# Patient Record
Sex: FEMALE | Race: OTHER | HISPANIC OR LATINO | Employment: FULL TIME | URBAN - METROPOLITAN AREA
[De-identification: names, ages, dates, MRNs, and addresses within clinical notes are randomized per-mention and may not be internally consistent; named-entity substitution may affect disease eponyms.]

---

## 2021-01-19 ENCOUNTER — OFFICE VISIT (OUTPATIENT)
Dept: OBGYN CLINIC | Facility: CLINIC | Age: 26
End: 2021-01-19
Payer: COMMERCIAL

## 2021-01-19 VITALS — WEIGHT: 189.3 LBS | DIASTOLIC BLOOD PRESSURE: 80 MMHG | SYSTOLIC BLOOD PRESSURE: 140 MMHG

## 2021-01-19 DIAGNOSIS — Z01.419 ENCOUNTER FOR GYNECOLOGICAL EXAMINATION WITH PAPANICOLAOU SMEAR OF CERVIX: Primary | ICD-10-CM

## 2021-01-19 DIAGNOSIS — Z72.51 HIGH RISK SEXUAL BEHAVIOR, UNSPECIFIED TYPE: ICD-10-CM

## 2021-01-19 DIAGNOSIS — Z87.42 HISTORY OF OVARIAN CYST: ICD-10-CM

## 2021-01-19 PROCEDURE — 99385 PREV VISIT NEW AGE 18-39: CPT | Performed by: OBSTETRICS & GYNECOLOGY

## 2021-01-19 NOTE — PROGRESS NOTES
ASSESSMENT & PLAN: Candice Lehman is a 31 y o  Yandel Segundo with normal gynecologic exam     1   Routine well woman exam done today  2  Pap:  The patient's last pap was 2018  It was normal     Pap was done today  Current ASCCP Guidelines reviewed  3   STD testing  was done   4  Gardasil recommendations reviewed   5  The following were reviewed in today's visit: breast self exam, family planning choices, exercise and healthy diet  6  tx for chlamydia in September interested in repeat testing     CC: Annual Gynecologic Examination    HPI: Candice Lehman is a 31 y o  Yandel Segundo who presents for annual gynecologic examination  She has the following concerns:  Desires STD testing / states was diagnosed with ovarian cyst in past and desires repeat US   Also diagnosed with endometriosis via laparoscopy     Health Maintenance:    She wears her seatbelt routinely  She does not perform regular monthly self breast exams  She feels safe at home  History reviewed  No pertinent past medical history  History reviewed  No pertinent surgical history  OB/Gyn History:    Pt does not have menstrual issues  =    History of sexually transmitted infection: Yes  History of abnormal pap smears: No      Patient is currently sexually active  OB History        0    Para   0    Term   0       0    AB   0    Living   0       SAB   0    TAB   0    Ectopic   0    Multiple   0    Live Births   0                 No family history on file      Social History:  Social History     Socioeconomic History    Marital status: Single     Spouse name: Not on file    Number of children: Not on file    Years of education: Not on file    Highest education level: Not on file   Occupational History    Not on file   Social Needs    Financial resource strain: Not on file    Food insecurity     Worry: Not on file     Inability: Not on file    Transportation needs     Medical: Not on file     Non-medical: Not on file Tobacco Use    Smoking status: Not on file   Substance and Sexual Activity    Alcohol use: Not Currently    Drug use: Not Currently    Sexual activity: Yes     Partners: Male   Lifestyle    Physical activity     Days per week: Not on file     Minutes per session: Not on file    Stress: Not on file   Relationships    Social connections     Talks on phone: Not on file     Gets together: Not on file     Attends Tenriism service: Not on file     Active member of club or organization: Not on file     Attends meetings of clubs or organizations: Not on file     Relationship status: Not on file    Intimate partner violence     Fear of current or ex partner: Not on file     Emotionally abused: Not on file     Physically abused: Not on file     Forced sexual activity: Not on file   Other Topics Concern    Not on file   Social History Narrative    Not on file         No Known Allergies    No current outpatient medications on file  Review of Systems:  Constitutional :no fever, feels well, no tiredness, no recent weight gain or loss  ENT: no ear ache, no loss of hearing, no nosebleeds or nasal discharge, no sore throat or hoarseness  Cardiovascular: no complaints of slow or fast heart beat, no chest pain, no palpitations, no leg claudication or lower extremity edema  Respiratory: no complaints of shortness of shortness of breath, no PERAZA  Breasts:no complaints of breast pain, breast lump, or nipple discharge  Gastrointestinal: no complaints of abdominal pain, constipation, nausea, vomiting, or diarrhea or bloody stools  Genitourinary : no complaints of dysuria, incontinence, pelvic pain, no dysmenorrhea, vaginal discharge or abnormal vaginal bleeding and as noted in HPI  Musculoskeletal: no complaints of arthralgia, no myalgia, no joint swelling or stiffness, no limb pain or swelling    Integumentary: no complaints of skin rash or lesion, itching or dry skin  Neurological: no complaints of headache, no confusion, no numbness or tingling, no dizziness or fainting    Objective      /80   Wt 85 9 kg (189 lb 4 8 oz)   LMP 12/25/2020     General:   appears stated age, cooperative, alert normal mood and affect   Neck: normal, supple,trachea midline, no masses   Heart: regular rate and rhythm, S1, S2 normal, no murmur, click, rub or gallop   Lungs: clear to auscultation bilaterally   Breasts: normal appearance, no masses or tenderness   Abdomen: soft, non-tender, without masses or organomegaly   Vulva: normal   Vagina: normal vagina, no discharge, exudate, lesion, or erythema   Urethra: normal   Cervix: Normal, no discharge  Nontender     Uterus: normal size, contour, position, consistency, mobility, non-tender   Adnexa: no mass, fullness, tenderness   Psychiatric orientation to person, place, and time: normal  mood and affect: normal

## 2021-01-20 LAB
C TRACH RRNA SPEC QL NAA+PROBE: NOT DETECTED
N GONORRHOEA RRNA SPEC QL NAA+PROBE: NOT DETECTED

## 2021-01-21 LAB
CLINICAL INFO: NORMAL
CYTO CVX: NORMAL
CYTOLOGY CMNT CVX/VAG CYTO-IMP: NORMAL
DATE PREVIOUS BX: NORMAL
HPV E6+E7 MRNA CVX QL NAA+PROBE: NOT DETECTED
LMP START DATE: NORMAL
SL AMB PREV. PAP:: NORMAL
SPECIMEN SOURCE CVX/VAG CYTO: NORMAL

## 2021-01-28 ENCOUNTER — HOSPITAL ENCOUNTER (OUTPATIENT)
Dept: ULTRASOUND IMAGING | Facility: HOSPITAL | Age: 26
Discharge: HOME | End: 2021-01-28
Attending: OBSTETRICS & GYNECOLOGY
Payer: COMMERCIAL

## 2021-01-28 DIAGNOSIS — Z87.42 HISTORY OF OVARIAN CYST: ICD-10-CM

## 2021-01-28 PROCEDURE — 76830 TRANSVAGINAL US NON-OB: CPT

## 2021-01-28 PROCEDURE — 76856 US EXAM PELVIC COMPLETE: CPT

## 2021-01-28 NOTE — LETTER
[address] [place] [address]  [address] [facility] [address]      February 10, 2021    MRN: [id number]     Phone: [phone]     Dear Ms Madhavi Britt recently had a(n) Ultrasound performed on 1/28/2021 at  [address] [place] [address] that was requested by Amira Sanderson MD  The study was reviewed by a radiologist, which is a physician who specializes in medical imaging  The radiologist issued a report describing his or her findings  In that report there was a finding that the radiologist felt warranted further discussion with your health care provider and that discussion would be beneficial to you  The results were sent to Amira Sanderson MD on 1/31/2021  We recommend that you contact Amira Sanderson MD at [phone] or set up an appointment to discuss the results of the imaging test  If you have already heard from Amira Sanderson MD regarding the results of your study, you can disregard this letter  This letter is not meant to alarm you, but intended to encourage you to follow-up on your results with the provider that sent you for the imaging study  In addition, we have enclosed answers to frequently asked questions by other patients who have also received a letter to review results with their health care provider (see page two)  Thank you for choosing [address] [place] [address] for your medical imaging needs  FREQUENTLY ASKED QUESTIONS    1  Why am I receiving this letter? [facility] requires us to notify patients who have findings on imaging exams that may require more testing or follow-up with a health professional within the next 3 months          2  How serious is the finding on the imaging test?  This letter is sent to all patients who may need follow-up or more testing within the next 3 months  Receiving this letter does not necessarily mean you have a life-threatening imaging finding or disease  Recommendations in the radiologists imaging report are general in nature and it is up to your healthcare provider to say whether those recommendations make sense for your situation  You are strongly encouraged to talk to your health care provider about the results and ask whether additional steps need to be taken  3  Where can I get a copy of the final report for my recent radiology exam?  To get a full copy of the report you can access your records online at [email or web address] or please contact [address] [place] [address] Records Department at [phone] Monday through Friday between 8 am and 6 pm          4  What do I need to do now? Please contact your health care provider who requested the imaging study to discuss what further actions (if any) are needed  You may have already heard from (your ordering provider) in regard to this test in which case you can disregard this letter  NOTICE IN ACCORDANCE WITH THE [facility] PATIENT TEST RESULT INFORMATION ACT OF 2018    You are receiving this notice as a result of a determination by your diagnostic imaging service that further discussions of your test results are warranted and would be beneficial to you  The complete results of your test or tests have been or will be sent to the health care practitioner that ordered the test or tests  It is recommended that you contact your health care practitioner to discuss your results as soon as possible

## 2021-02-04 NOTE — RESULT ENCOUNTER NOTE
Please inform patient large bilateral ovarian cyst / I recommend follow up (can be virtual) to discuss thanks

## 2021-02-08 ENCOUNTER — APPOINTMENT (EMERGENCY)
Dept: CT IMAGING | Facility: HOSPITAL | Age: 26
End: 2021-02-08
Payer: COMMERCIAL

## 2021-02-08 ENCOUNTER — HOSPITAL ENCOUNTER (EMERGENCY)
Facility: HOSPITAL | Age: 26
Discharge: HOME | End: 2021-02-08
Attending: EMERGENCY MEDICINE | Admitting: EMERGENCY MEDICINE
Payer: COMMERCIAL

## 2021-02-08 ENCOUNTER — APPOINTMENT (EMERGENCY)
Dept: ULTRASOUND IMAGING | Facility: HOSPITAL | Age: 26
End: 2021-02-08
Payer: COMMERCIAL

## 2021-02-08 VITALS
SYSTOLIC BLOOD PRESSURE: 124 MMHG | WEIGHT: 186.07 LBS | HEART RATE: 80 BPM | RESPIRATION RATE: 18 BRPM | BODY MASS INDEX: 32.97 KG/M2 | OXYGEN SATURATION: 98 % | HEIGHT: 63 IN | TEMPERATURE: 99.1 F | DIASTOLIC BLOOD PRESSURE: 72 MMHG

## 2021-02-08 DIAGNOSIS — M54.50 ACUTE LEFT-SIDED LOW BACK PAIN WITHOUT SCIATICA: ICD-10-CM

## 2021-02-08 DIAGNOSIS — N83.8 OVARIAN MASS, LEFT: Primary | ICD-10-CM

## 2021-02-08 DIAGNOSIS — N83.8 OVARIAN MASS, RIGHT: ICD-10-CM

## 2021-02-08 LAB
ANION GAP SERPL CALCULATED.3IONS-SCNC: 5 MMOL/L (ref 4–13)
BASOPHILS # BLD AUTO: 0.03 THOUSANDS/ΜL (ref 0–0.1)
BASOPHILS NFR BLD AUTO: 0 % (ref 0–1)
BILIRUB UR QL STRIP: NEGATIVE
BUN SERPL-MCNC: 9 MG/DL (ref 5–25)
CALCIUM SERPL-MCNC: 9 MG/DL (ref 8.3–10.1)
CHLORIDE SERPL-SCNC: 104 MMOL/L (ref 100–108)
CLARITY UR: CLEAR
CO2 SERPL-SCNC: 29 MMOL/L (ref 21–32)
COLOR UR: YELLOW
COLOR, POC: YELLOW
CREAT SERPL-MCNC: 0.67 MG/DL (ref 0.6–1.3)
EOSINOPHIL # BLD AUTO: 0.12 THOUSAND/ΜL (ref 0–0.61)
EOSINOPHIL NFR BLD AUTO: 1 % (ref 0–6)
ERYTHROCYTE [DISTWIDTH] IN BLOOD BY AUTOMATED COUNT: 14.3 % (ref 11.6–15.1)
EXT PREG TEST URINE: NEGATIVE
EXT. CONTROL ED NAV: NORMAL
GFR SERPL CREATININE-BSD FRML MDRD: 122 ML/MIN/1.73SQ M
GLUCOSE SERPL-MCNC: 72 MG/DL (ref 65–140)
GLUCOSE UR STRIP-MCNC: NEGATIVE MG/DL
HCT VFR BLD AUTO: 42.6 % (ref 34.8–46.1)
HGB BLD-MCNC: 13.3 G/DL (ref 11.5–15.4)
HGB UR QL STRIP.AUTO: NEGATIVE
IMM GRANULOCYTES # BLD AUTO: 0.03 THOUSAND/UL (ref 0–0.2)
IMM GRANULOCYTES NFR BLD AUTO: 0 % (ref 0–2)
KETONES UR STRIP-MCNC: NEGATIVE MG/DL
LEUKOCYTE ESTERASE UR QL STRIP: NEGATIVE
LYMPHOCYTES # BLD AUTO: 2.09 THOUSANDS/ΜL (ref 0.6–4.47)
LYMPHOCYTES NFR BLD AUTO: 21 % (ref 14–44)
MCH RBC QN AUTO: 27.5 PG (ref 26.8–34.3)
MCHC RBC AUTO-ENTMCNC: 31.2 G/DL (ref 31.4–37.4)
MCV RBC AUTO: 88 FL (ref 82–98)
MONOCYTES # BLD AUTO: 0.69 THOUSAND/ΜL (ref 0.17–1.22)
MONOCYTES NFR BLD AUTO: 7 % (ref 4–12)
NEUTROPHILS # BLD AUTO: 7.01 THOUSANDS/ΜL (ref 1.85–7.62)
NEUTS SEG NFR BLD AUTO: 71 % (ref 43–75)
NITRITE UR QL STRIP: NEGATIVE
NRBC BLD AUTO-RTO: 0 /100 WBCS
PH UR STRIP.AUTO: 6 [PH] (ref 4.5–8)
PLATELET # BLD AUTO: 277 THOUSANDS/UL (ref 149–390)
PMV BLD AUTO: 11.4 FL (ref 8.9–12.7)
POTASSIUM SERPL-SCNC: 4.5 MMOL/L (ref 3.5–5.3)
PROT UR STRIP-MCNC: NEGATIVE MG/DL
RBC # BLD AUTO: 4.84 MILLION/UL (ref 3.81–5.12)
SODIUM SERPL-SCNC: 138 MMOL/L (ref 136–145)
SP GR UR STRIP.AUTO: 1.02 (ref 1–1.03)
UROBILINOGEN UR QL STRIP.AUTO: 1 E.U./DL
WBC # BLD AUTO: 9.97 THOUSAND/UL (ref 4.31–10.16)

## 2021-02-08 PROCEDURE — 76830 TRANSVAGINAL US NON-OB: CPT

## 2021-02-08 PROCEDURE — 81003 URINALYSIS AUTO W/O SCOPE: CPT

## 2021-02-08 PROCEDURE — 76856 US EXAM PELVIC COMPLETE: CPT

## 2021-02-08 PROCEDURE — G1004 CDSM NDSC: HCPCS

## 2021-02-08 PROCEDURE — 99284 EMERGENCY DEPT VISIT MOD MDM: CPT

## 2021-02-08 PROCEDURE — 74176 CT ABD & PELVIS W/O CONTRAST: CPT

## 2021-02-08 PROCEDURE — 81025 URINE PREGNANCY TEST: CPT | Performed by: EMERGENCY MEDICINE

## 2021-02-08 PROCEDURE — 80048 BASIC METABOLIC PNL TOTAL CA: CPT | Performed by: EMERGENCY MEDICINE

## 2021-02-08 PROCEDURE — 85025 COMPLETE CBC W/AUTO DIFF WBC: CPT | Performed by: EMERGENCY MEDICINE

## 2021-02-08 PROCEDURE — 99284 EMERGENCY DEPT VISIT MOD MDM: CPT | Performed by: EMERGENCY MEDICINE

## 2021-02-08 PROCEDURE — 36415 COLL VENOUS BLD VENIPUNCTURE: CPT | Performed by: EMERGENCY MEDICINE

## 2021-02-09 ENCOUNTER — TELEPHONE (OUTPATIENT)
Dept: OBGYN CLINIC | Facility: MEDICAL CENTER | Age: 26
End: 2021-02-09

## 2021-02-09 NOTE — ED PROVIDER NOTES
History  Chief Complaint   Patient presents with    Flank Pain     Patient reports L flank pain, urinary frequency since yesterday  This is an otherwise healthy 48-year-old female who presents with left-sided flank pain  Her pain began last night  Pain is described as a pressure, intermittent, radiating to her lower back, associated with nausea without vomiting  She tried taking Advil for the pain with mild relief  Denies any exacerbating factors  She does describe urinary frequency since yesterday  Denies any history of kidney stones  Patient is currently asymptomatic  Denies fever/chills, vomiting, lightheadedness/dizziness, numbness/weakness, headache, change in vision, URI symptoms, neck pain, chest pain, palpitations, shortness of breath, cough, back pain, abdominal pain, diarrhea, hematochezia, melena, dysuria, hematuria, abnormal vaginal discharge/bleeding  None       History reviewed  No pertinent past medical history  History reviewed  No pertinent surgical history  History reviewed  No pertinent family history  I have reviewed and agree with the history as documented  E-Cigarette/Vaping    E-Cigarette Use Never User      E-Cigarette/Vaping Substances     Social History     Tobacco Use    Smoking status: Current Some Day Smoker    Smokeless tobacco: Never Used    Tobacco comment: Hookah   Substance Use Topics    Alcohol use: Not Currently    Drug use: Not Currently       Review of Systems   Constitutional: Negative for chills and fever  HENT: Negative for rhinorrhea, sore throat and trouble swallowing  Eyes: Negative for photophobia and visual disturbance  Respiratory: Negative for cough, chest tightness and shortness of breath  Cardiovascular: Negative for chest pain, palpitations and leg swelling  Gastrointestinal: Positive for nausea  Negative for abdominal pain, blood in stool, diarrhea and vomiting  Endocrine: Negative for polyuria     Genitourinary: Positive for flank pain and frequency  Negative for dysuria, hematuria, vaginal bleeding and vaginal discharge  Musculoskeletal: Negative for back pain and neck pain  Skin: Negative for color change and rash  Allergic/Immunologic: Negative for immunocompromised state  Neurological: Negative for dizziness, weakness, light-headedness, numbness and headaches  All other systems reviewed and are negative  Physical Exam  Physical Exam  Constitutional:       General: She is not in acute distress  Appearance: Normal appearance  She is well-developed  HENT:      Mouth/Throat:      Pharynx: Uvula midline  Eyes:      Conjunctiva/sclera: Conjunctivae normal       Pupils: Pupils are equal, round, and reactive to light  Neck:      Thyroid: No thyroid mass or thyromegaly  Trachea: Trachea normal    Cardiovascular:      Rate and Rhythm: Normal rate and regular rhythm  Pulses: Normal pulses  Heart sounds: Normal heart sounds  No murmur  Pulmonary:      Effort: Pulmonary effort is normal       Breath sounds: Normal breath sounds  Abdominal:      General: Bowel sounds are normal       Palpations: Abdomen is soft  Tenderness: There is no abdominal tenderness  There is no guarding or rebound  Skin:     General: Skin is warm and dry  Neurological:      Mental Status: She is alert  Psychiatric:         Speech: Speech normal          Behavior: Behavior normal  Behavior is cooperative  Thought Content:  Thought content normal          Vital Signs  ED Triage Vitals [02/08/21 1819]   Temperature Pulse Respirations Blood Pressure SpO2   99 1 °F (37 3 °C) 82 16 139/85 98 %      Temp Source Heart Rate Source Patient Position - Orthostatic VS BP Location FiO2 (%)   Oral Monitor Sitting Left arm --      Pain Score       5           Vitals:    02/08/21 1819 02/08/21 2138   BP: 139/85 124/72   Pulse: 82 80   Patient Position - Orthostatic VS: Sitting Lying         Visual Acuity      ED Medications  Medications - No data to display    Diagnostic Studies  Results Reviewed     Procedure Component Value Units Date/Time    Basic metabolic panel [[id number]] Collected: 02/08/21 1924    Lab Status: Final result Specimen: Blood from Arm, Right Updated: 02/08/21 1958     Sodium 138 mmol/L      Potassium 4 5 mmol/L      Chloride 104 mmol/L      CO2 29 mmol/L      ANION GAP 5 mmol/L      BUN 9 mg/dL      Creatinine 0 67 mg/dL      Glucose 72 mg/dL      Calcium 9 0 mg/dL      eGFR 122 ml/min/1 73sq m     Narrative:      [place] guidelines for Chronic Kidney Disease (CKD):     Stage 1 with normal or high GFR (GFR > 90 mL/min/1 73 square meters)    Stage 2 Mild CKD (GFR = 60-89 mL/min/1 73 square meters)    Stage 3A Moderate CKD (GFR = 45-59 mL/min/1 73 square meters)    Stage 3B Moderate CKD (GFR = 30-44 mL/min/1 73 square meters)    Stage 4 Severe CKD (GFR = 15-29 mL/min/1 73 square meters)    Stage 5 End Stage CKD (GFR <15 mL/min/1 73 square meters)  Note: GFR calculation is accurate only with a steady state creatinine    CBC and differential [[id number]]  (Abnormal) Collected: 02/08/21 1924    Lab Status: Final result Specimen: Blood from Arm, Right Updated: 02/08/21 1941     WBC 9 97 Thousand/uL      RBC 4 84 Million/uL      Hemoglobin 13 3 g/dL      Hematocrit 42 6 %      MCV 88 fL      MCH 27 5 pg      MCHC 31 2 g/dL      RDW 14 3 %      MPV 11 4 fL      Platelets 421 Thousands/uL      nRBC 0 /100 WBCs      Neutrophils Relative 71 %      Immat GRANS % 0 %      Lymphocytes Relative 21 %      Monocytes Relative 7 %      Eosinophils Relative 1 %      Basophils Relative 0 %      Neutrophils Absolute 7 01 Thousands/µL      Immature Grans Absolute 0 03 Thousand/uL      Lymphocytes Absolute 2 09 Thousands/µL      Monocytes Absolute 0 69 Thousand/µL      Eosinophils Absolute 0 12 Thousand/µL      Basophils Absolute 0 03 Thousands/µL     POCT urinalysis dipstick [[id number]] (Normal) Resulted: 02/08/21 1913    Lab Status: Final result Specimen: Urine Updated: 02/08/21 1913     Color, UA yellow    POCT pregnancy, urine [[id number]]  (Normal) Resulted: 02/08/21 1913    Lab Status: Final result Updated: 02/08/21 1913     EXT PREG TEST UR (Ref: Negative) negative     Control valid    Urine Macroscopic, POC [[id number]] Collected: 02/08/21 1911    Lab Status: Final result Specimen: Urine Updated: 02/08/21 1912     Color, UA Yellow     Clarity, UA Clear     pH, UA 6 0     Leukocytes, UA Negative     Nitrite, UA Negative     Protein, UA Negative mg/dl      Glucose, UA Negative mg/dl      Ketones, UA Negative mg/dl      Urobilinogen, UA 1 0 E U /dl      Bilirubin, UA Negative     Blood, UA Negative     Specific Gravity, UA 1 025    Narrative:      CLINITEK RESULT                 US pelvis complete w transvaginal   Final Result by Quinton Lutz DO (02/08 2125)   Unremarkable uterus/endometrium  No evidence for ovarian torsion  Neither of the ovarian lesions demonstrate significant peripheral vascularity to suggest a tubo-ovarian abscess      Both ovaries are enlarged containing complex cystic lesions as described above  Right ovarian lesion: 7 6 cm  This may potentially be an atypical endometrioma  By O - rads ultrasound risk stratification and management system:   Score 5-high risk (greater than 50%) (management by gyn onc)   Unilocular cyst, any size, >4 papillary projections, color score any      Left ovarian lesion: 5 2 cm  This may potentially be an endometrioma  By O - rads ultrasound risk stratification and management system:   Score 3-low risk malignancy (1-10%) (management by gynecologist   Consider MRI)   Unilocular cyst, any size with irregular inner wall <3 mm height   Multilocular cysts <10 cm, smooth inner wall, color score 4      Left ovarian lesion: 3 2 cm  This is likely either a hemorrhagic cyst or endometrioma    Follow-up ultrasound recommended in 6-12 weeks  The study was marked in [facility] for immediate notification  Workstation performed: [id number]         CT renal stone study abdomen pelvis without contrast   Final Result by Blas Cintron MD (02/08 1951)         1  Two  pelvic masses measuring 8 and 6 2 cm  Limited evaluation absence of contrast   Recommend pelvic ultrasound  2   Bilateral nonobstructing 1 to 2 mm renal calculi  Workstation performed: [id number]                    Procedures  Procedures         ED Course                             SBIRT 22yo+      Most Recent Value   SBIRT (22 yo +)   In order to provide better care to our patients, we are screening all of our patients for alcohol and drug use  Would it be okay to ask you these screening questions? No Filed at: 02/08/2021 1835   Initial Alcohol Screen: US AUDIT-C    1  How often do you have a drink containing alcohol?  0 Filed at: 02/08/2021 1835   2  How many drinks containing alcohol do you have on a typical day you are drinking? 0 Filed at: 02/08/2021 1835   3a  Male UNDER 65: How often do you have five or more drinks on one occasion? 0 Filed at: 02/08/2021 1835   3b  FEMALE Any Age, or MALE 65+: How often do you have 4 or more drinks on one occassion? 0 Filed at: 02/08/2021 1835   Audit-C Score  0 Filed at: 02/08/2021 1835   CHELSI: How many times in the past year have you    Used an illegal drug or used a prescription medication for non-medical reasons? Never Filed at: 02/08/2021 [address]                    MDM  Number of Diagnoses or Management Options  Diagnosis management comments: Plan to check basic lab work, urine  CT renal stone study  Disposition pending results        Disposition  Final diagnoses:   Ovarian mass, left   Ovarian mass, right   Acute left-sided low back pain without sciatica     Time reflects when diagnosis was documented in both MDM as applicable and the Disposition within this note Time User Action Codes Description Comment    2/8/2021  9:29 PM Luna Barrette Add [N83 8] Bilateral tubo-ovarian mass     2/8/2021  9:29 PM Rae Davila TIMA Remove [N83 8] Bilateral tubo-ovarian mass     2/8/2021  9:30 PM Luna Barrette Add [N83 8] Ovarian mass, left     2/8/2021  9:30 PM Luna Barrette Add [N83 8] Ovarian mass, right     2/8/2021  9:30 PM Luna Barrette Add [M54 5] Acute left-sided low back pain without sciatica       ED Disposition     ED Disposition Condition Date/Time Comment    Discharge Stable Mon Feb 8, 2021  9:29 PM Flavio Faye discharge to home/self care  Follow-up Information     Follow up With Specialties Details Why Contact Info Additional Information    Vidal Corrales MD Gynecologic Oncology Schedule an appointment as soon as possible for a visit  Call tomorrow morning for follow up, need to follow up to make sure these are not cancerous [address] Emergency Department Emergency Medicine Go to  If symptoms worsen [facility] [phone]  [address] [facility] Emergency Department, [address] [facility] , [place], [place], [facility]          Patient's Medications    No medications on file     No discharge procedures on file      PDMP Review     None          ED Provider  Electronically Signed by           Damon Ochoa MD  02/08/21 8464

## 2021-02-09 NOTE — TELEPHONE ENCOUNTER
Pt called and requested to go over ultrasound and labwork results  Pt was told that it does take a few days for a provider to review results  Pt requested to speak with provider as soon as possible as she is worried about results  Pt also has a duplicate chart on file, request for merge was submitted  Please review

## 2021-03-01 ENCOUNTER — OFFICE VISIT (OUTPATIENT)
Dept: OBGYN CLINIC | Facility: CLINIC | Age: 26
End: 2021-03-01
Payer: COMMERCIAL

## 2021-03-01 VITALS — WEIGHT: 190.2 LBS | SYSTOLIC BLOOD PRESSURE: 130 MMHG | DIASTOLIC BLOOD PRESSURE: 75 MMHG

## 2021-03-01 DIAGNOSIS — N83.201 RIGHT OVARIAN CYST: Primary | ICD-10-CM

## 2021-03-01 DIAGNOSIS — Z87.42 HX OF ENDOMETRIOSIS: ICD-10-CM

## 2021-03-01 PROCEDURE — 99213 OFFICE O/P EST LOW 20 MIN: CPT | Performed by: OBSTETRICS & GYNECOLOGY

## 2021-03-01 NOTE — PROGRESS NOTES
Assessment Panchito Hameed was seen today for follow-up  Diagnoses and all orders for this visit:    Right ovarian cyst  -     MRI pelvis female wo and w contrast; Future    Hx of endometriosis         Plan  Today we reviewed pelvic US sent for pelvic pain and hx of ovarian cyst diagnosed since at least 2018  She had surgery at [facility] at that time (record reviewed) and endometriosis was diagnosed by laparoscopy  US shows bilateral complex ovarian cyst on right more than left  This seems most consistent with endometriomas and not hemorrhagic cyst as I am able to see [facility] records consistent with same size cyst in 2018  I have ordered pelvoc MRI to better characterize ovarian lesions  Today we discussed possible management options such as Lupron/ birth control or surgical management   Aware surgical management would be cystectomy vs oophorectomy  Patient interested in getting more definitive answers prior to commiting to next step in management        Darin Linares is a 31 y o  female here for a problem visit  Patient is complaining of pelvic pain on right more than left  States she had surgery for this in 2018 at [facility]  At that time was told cyst was drained   Since then she has experienced on and off pelvic pain  Interested in getting more information and discuss management options   There is no problem list on file for this patient  Gynecologic History  Patient's last menstrual period was 02/23/2021  The current method of family planning is none  History reviewed  No pertinent past medical history  No past surgical history on file  No family history on file    Social History     Socioeconomic History    Marital status: Single     Spouse name: Not on file    Number of children: Not on file    Years of education: Not on file    Highest education level: Not on file   Occupational History    Not on file   Social Needs    Financial resource strain: Not on file    Food insecurity     Worry: Not on file     Inability: Not on file    Transportation needs     Medical: Not on file     Non-medical: Not on file   Tobacco Use    Smoking status: Never Smoker    Smokeless tobacco: Never Used   Substance and Sexual Activity    Alcohol use: Not Currently    Drug use: Not Currently    Sexual activity: Yes     Partners: Male   Lifestyle    Physical activity     Days per week: Not on file     Minutes per session: Not on file    Stress: Not on file   Relationships    Social connections     Talks on phone: Not on file     Gets together: Not on file     Attends [employer] service: Not on file     Active member of club or organization: Not on file     Attends meetings of clubs or organizations: Not on file     Relationship status: Not on file    Intimate partner violence     Fear of current or ex partner: Not on file     Emotionally abused: Not on file     Physically abused: Not on file     Forced sexual activity: Not on file   Other Topics Concern    Not on file   Social History Narrative    Not on file     No Known Allergies  No current outpatient medications on file  Review of Systems  Constitutional :no fever, feels well, no tiredness, no recent weight gain or loss  ENT: no ear ache, no loss of hearing, no nosebleeds or nasal discharge, no sore throat or hoarseness  Cardiovascular: no complaints of slow or fast heart beat, no chest pain, no palpitations, no leg claudication or lower extremity edema  Respiratory: no complaints of shortness of shortness of breath, no PERAZA  Breasts:no complaints of breast pain, breast lump, or nipple discharge  Gastrointestinal: no complaints of abdominal pain, constipation, nausea, vomiting, or diarrhea or bloody stools  Genitourinary : no complaints of dysuria, incontinence no dysmenorrhea, vaginal discharge or abnormal vaginal bleeding and as noted in HPI  - positive for pelvic pain      Musculoskeletal: no complaints of arthralgia, no myalgia, no joint swelling or stiffness, no limb pain or swelling  Integumentary: no complaints of skin rash or lesion, itching or dry skin  Neurological: no complaints of headache, no confusion, no numbness or tingling, no dizziness or fainting     Objective     /75   Wt 86 3 kg (190 lb 3 2 oz)   LMP 02/23/2021     General:   appears stated age, cooperative, alert normal mood and affect   Lungs: Unlabored breathing    Skin normal skin turgor and no rashes  Psychiatric orientation to person, place, and time: normal  mood and affect: normal   UTERUS:  The uterus is anteverted in position, measuring 6 9 x 4 5 x 4 9 cm  Contour and echotexture appear normal   The cervix shows no suspicious abnormality      ENDOMETRIUM:    Normal caliber of 2 mm  Homogeneous and normal in appearance      OVARIES/ADNEXA:  Right ovary:  9 1 x 6 2 x 10 cm  There is a large ovarian cystic mass  Low-level pedicles and septations with Doppler flow measuring 8 6 x 5 2 x 9 7 cm  Based on the ACR O-RADS system, this is O-RADS category 2 (almost certain benign with <5% risk of malignancy ) The management   recommendation is evaluation with pelvic MRI with and without IV contrast      REFERENCE: Radiology 2020; [id number]              Doppler flow within normal limits      Left ovary:  6 4 x 5 4 x 6 2 cm     There is a complex cystic mass with low-level echoes measuring 5 2 x 4 6 x 5 27 Based on the ACR O-RADS system, this is O-RADS category 2 (almost certain benign with <6% risk of malignancy ) The management recommendation is evaluation with pelvic MRI   with and without IV contrast      REFERENCE: Radiology 2020; [id number]

## 2021-03-02 ENCOUNTER — TELEPHONE (OUTPATIENT)
Dept: OBGYN CLINIC | Facility: MEDICAL CENTER | Age: 26
End: 2021-03-02

## 2021-03-02 NOTE — TELEPHONE ENCOUNTER
----- Message from Rodrigo Ceballos RN sent at 3/1/2021  4:55 PM EST -----  Regarding: FW: MRI  I instructed patient to schedule MRI for Thursday or Friday at the soonest and call back with date and time for MRI  I asked her to contact you directly with that info  ----- Message -----  From: Chantal Green MD  Sent: 3/1/2021   1:08 PM EST  To: Rodrigo Ceballos RN, [facility]  Subject: MRI                                              Hello Ladies not sure who to send this to,  This patient was seen by me she has bilateral ovarian cyst - endometriomas most likley   I do want an MRI with and without contrast - does she need precert? If so can it be done ?    Thanks

## 2021-03-03 ENCOUNTER — TRANSCRIBE ORDERS (OUTPATIENT)
Dept: ADMINISTRATIVE | Facility: HOSPITAL | Age: 26
End: 2021-03-03

## 2021-03-03 DIAGNOSIS — N83.209 OVARIAN CYST: Primary | ICD-10-CM

## 2021-03-12 ENCOUNTER — TELEPHONE (OUTPATIENT)
Dept: OBGYN CLINIC | Facility: MEDICAL CENTER | Age: 26
End: 2021-03-12

## 2021-03-12 NOTE — TELEPHONE ENCOUNTER
PA completed, spoke to [place] from [employer]   Approved for pelvis mri with and without contrast   Cpt code: 78862  DX code: [id number]    Case number- [id number]  Auth [id number]

## 2021-03-12 NOTE — TELEPHONE ENCOUNTER
Attempted to call pt if mri had been scheduled and if scheduled outside of the network and to obtain insurance card  Pt does not have mychart and I was unable to leave voice message due to a mail box being full

## 2021-03-12 NOTE — TELEPHONE ENCOUNTER
----- Message from [facility] sent at 3/12/2021 12:59 PM EST -----  Regarding: FW: MRI    ----- Message -----  From: Stefani Christensen  Sent: 3/5/2021   9:48 AM EST  To: Stefani Christensen  Subject: RE: MRI                                          Pt is scheduled for 3/17  ----- Message -----  From: Elaine Canas RN  Sent: 3/1/2021   4:55 PM EST  To: Stefani Christensen  Subject: FW: MRI                                          I instructed patient to schedule MRI for Thursday or Friday at the soonest and call back with date and time for MRI  I asked her to contact you directly with that info  ----- Message -----  From: Blane Conway MD  Sent: 3/1/2021   1:08 PM EST  To: Elaine Canas RN, Stefani Christensen  Subject: MRI                                              Hello Ladies not sure who to send this to,  This patient was seen by me she has bilateral ovarian cyst - endometriomas most likley   I do want an MRI with and without contrast - does she need precert? If so can it be done ?    Thanks

## 2021-03-17 ENCOUNTER — HOSPITAL ENCOUNTER (OUTPATIENT)
Dept: RADIOLOGY | Age: 26
Discharge: HOME | End: 2021-03-17
Payer: COMMERCIAL

## 2021-03-17 DIAGNOSIS — N83.209 OVARIAN CYST: ICD-10-CM

## 2021-03-17 PROCEDURE — 72197 MRI PELVIS W/O & W/DYE: CPT

## 2021-03-17 PROCEDURE — G1004 CDSM NDSC: HCPCS

## 2021-03-17 PROCEDURE — A9585 GADOBUTROL INJECTION: HCPCS | Performed by: OBSTETRICS & GYNECOLOGY

## 2021-03-17 RX ADMIN — GADOBUTROL 8 ML: 604.72 INJECTION INTRAVENOUS at 09:55

## 2021-03-23 NOTE — RESULT ENCOUNTER NOTE
Please inform patient  Bilateral endometriomas ,please have patient scheduled follow up thanks, non urgent visit

## 2021-04-06 ENCOUNTER — OFFICE VISIT (OUTPATIENT)
Dept: OBGYN CLINIC | Facility: CLINIC | Age: 26
End: 2021-04-06
Payer: COMMERCIAL

## 2021-04-06 VITALS — SYSTOLIC BLOOD PRESSURE: 125 MMHG | DIASTOLIC BLOOD PRESSURE: 75 MMHG | BODY MASS INDEX: 33.52 KG/M2 | WEIGHT: 189.2 LBS

## 2021-04-06 DIAGNOSIS — N80.1 ENDOMETRIOMA OF OVARY: ICD-10-CM

## 2021-04-06 DIAGNOSIS — R10.2 PELVIC PAIN: Primary | ICD-10-CM

## 2021-04-06 PROCEDURE — 99213 OFFICE O/P EST LOW 20 MIN: CPT | Performed by: OBSTETRICS & GYNECOLOGY

## 2021-04-06 RX ORDER — NAPROXEN 250 MG/1
250 TABLET ORAL
Qty: 30 TABLET | Refills: 1 | Status: SHIPPED | OUTPATIENT
Start: 2021-04-06

## 2021-04-08 NOTE — PROGRESS NOTES
Assessment Danyel Garcia was seen today for follow-up  Diagnoses and all orders for this visit:    Pelvic pain  -     naproxen (NAPROSYN) 250 mg tablet; Take 1 tablet (250 mg total) by mouth 3 (three) times a day with meals    Endometrioma of ovary         Plan  30 yo G0 with pelvic pain and bilateral ovarian endometriomas   We discussed surgical management in the form of diagnostic laparoscopy and ovarian cystectomy with possible oophorectomy   We discussed plan would be to preserve ovarian function given her age and parity  Aware that if bleeding intraop sometimes an oophorectomy is indicated   Risks of surgery discussed such as injury to surrounding organ such as bowel , bladder, major blood vessels, ureters , uterus, tubes and ovaries   We discussed in event oophorectomy necessary there is a change of infertility     Patient verbalized understanding   We discussed timing of surgery but patient needed to plan with her job and desired to wait if possible     Subjective   Gaston Hoang is a 31 y o  female here for a problem visit  Patient is complaining of pelvic pain worse some days than others  States worse with ovulation  She had tried birth control but does not tolerate side effects  She has had to miss work some days because of pain  She had surgery for this at [facility] some years ago and was told had endometriomas which were drained  There is no problem list on file for this patient  Gynecologic History  No LMP recorded  The current method of family planning is none  History reviewed  No pertinent past medical history  History reviewed  No pertinent surgical history  History reviewed  No pertinent family history    Social History     Socioeconomic History    Marital status: Single     Spouse name: Not on file    Number of children: Not on file    Years of education: Not on file    Highest education level: Not on file   Occupational History    Not on file   Social Needs    Financial resource strain: Not on file    Food insecurity     Worry: Not on file     Inability: Not on file   [employer] needs     Medical: Not on file     Non-medical: Not on file   Tobacco Use    Smoking status: Never Smoker    Smokeless tobacco: Never Used    Tobacco comment: Hookah   Substance and Sexual Activity    Alcohol use: Not Currently    Drug use: Not Currently    Sexual activity: Yes     Partners: Male   Lifestyle    Physical activity     Days per week: Not on file     Minutes per session: Not on file    Stress: Not on file   Relationships    Social connections     Talks on phone: Not on file     Gets together: Not on file     Attends Yazidi service: Not on file     Active member of club or organization: Not on file     Attends meetings of clubs or organizations: Not on file     Relationship status: Not on file    Intimate partner violence     Fear of current or ex partner: Not on file     Emotionally abused: Not on file     Physically abused: Not on file     Forced sexual activity: Not on file   Other Topics Concern    Not on file   Social History Narrative    ** Merged History Encounter **          No Known Allergies    Current Outpatient Medications:     naproxen (NAPROSYN) 250 mg tablet, Take 1 tablet (250 mg total) by mouth 3 (three) times a day with meals, Disp: 30 tablet, Rfl: 1    Review of Systems  Constitutional :no fever, feels well, no tiredness, no recent weight gain or loss  ENT: no ear ache, no loss of hearing, no nosebleeds or nasal discharge, no sore throat or hoarseness  Cardiovascular: no complaints of slow or fast heart beat, no chest pain, no palpitations, no leg claudication or lower extremity edema    Respiratory: no complaints of shortness of shortness of breath, no PERAZA  Breasts:no complaints of breast pain, breast lump, or nipple discharge  Gastrointestinal: no complaints of abdominal pain, constipation, nausea, vomiting, or diarrhea or bloody stools  Genitourinary :  as noted in HPI  Musculoskeletal: no complaints of arthralgia, no myalgia, no joint swelling or stiffness, no limb pain or swelling  Integumentary: no complaints of skin rash or lesion, itching or dry skin  Neurological: no complaints of headache, no confusion, no numbness or tingling, no dizziness or fainting     Objective     /75   Wt 85 8 kg (189 lb 3 2 oz)   BMI 33 52 kg/m²     General:   appears stated age, cooperative, alert normal mood and affect   Skin normal skin turgor and no rashes  Psychiatric orientation to person, place, and time: normal  mood and affect: normal         FINDINGS:     UTERUS: The uterus is anteverted and measures 8 2 x 3 1 x 5 1 cm in size  There is borderline thickening of the junctional zone measuring 10 mm  The endometrium is within normal limits measuring 8 mm in thickness        ADNEXA:     The right ovary measures 9 8 x 8 5 x 5 5 cm in the left ovary measures 7 4 x 6 3 x 5 2 cm in size  There are bilateral ovarian lesions measuring 8 2 x 5 1 x 8 9 cm on the right and 5 8 x 4 7 x 5 3 cm on the left side (series 11 images 55-90),   corresponding to the lesion seen on prior CT and ultrasound examinations  Both lesions demonstrate hyperintense T1-weighted and mildly hypointense T2-weighted signal (T2 shading)  Both lesions demonstrate thin enhancing internal septations, but no   papillary projections or enhancing solid components  These are most in keeping with bilateral ovarian endometriomas      BLADDER: Normal      PELVIC CAVITY:  No lymphadenopathy      BOWEL:  Unremarkable MRI appearance      OSSEOUS STRUCTURES:   No osseous destruction      VASCULAR STRUCTURES:  Visualized vasculature is normal      PELVIC WALL:  Small right sided Bartholin's gland cyst measuring 9 x8mm (series 6 image 35)     IMPRESSION:        1    Bilateral ovarian lesions measuring 8 2 x 5 1 x 8 9 cm on the right and 5 8 x 4 7 x 5 3 cm on the left side, in keeping with bilateral ovarian endometriomas      2    Borderline thickening of the uterine junctional zone measuring 10 mm

## 2021-04-13 DIAGNOSIS — Z23 ENCOUNTER FOR IMMUNIZATION: ICD-10-CM

## 2021-05-11 ENCOUNTER — OFFICE VISIT (OUTPATIENT)
Dept: OBGYN CLINIC | Facility: MEDICAL CENTER | Age: 26
End: 2021-05-11
Payer: COMMERCIAL

## 2021-05-11 VITALS — BODY MASS INDEX: 33.3 KG/M2 | WEIGHT: 188 LBS | SYSTOLIC BLOOD PRESSURE: 130 MMHG | DIASTOLIC BLOOD PRESSURE: 80 MMHG

## 2021-05-11 DIAGNOSIS — Z72.51 HIGH RISK HETEROSEXUAL BEHAVIOR: ICD-10-CM

## 2021-05-11 DIAGNOSIS — N76.0 BV (BACTERIAL VAGINOSIS): Primary | ICD-10-CM

## 2021-05-11 DIAGNOSIS — B96.89 BV (BACTERIAL VAGINOSIS): Primary | ICD-10-CM

## 2021-05-11 DIAGNOSIS — B96.89 BV (BACTERIAL VAGINOSIS): ICD-10-CM

## 2021-05-11 DIAGNOSIS — N76.0 BV (BACTERIAL VAGINOSIS): ICD-10-CM

## 2021-05-11 PROCEDURE — 87491 CHLMYD TRACH DNA AMP PROBE: CPT | Performed by: OBSTETRICS & GYNECOLOGY

## 2021-05-11 PROCEDURE — 87591 N.GONORRHOEAE DNA AMP PROB: CPT | Performed by: OBSTETRICS & GYNECOLOGY

## 2021-05-11 PROCEDURE — 99213 OFFICE O/P EST LOW 20 MIN: CPT | Performed by: OBSTETRICS & GYNECOLOGY

## 2021-05-11 RX ORDER — METRONIDAZOLE 500 MG/1
500 TABLET ORAL EVERY 12 HOURS SCHEDULED
Qty: 14 TABLET | Refills: 0 | Status: SHIPPED | OUTPATIENT
Start: 2021-05-11 | End: 2021-05-11

## 2021-05-11 RX ORDER — METRONIDAZOLE 500 MG/1
500 TABLET ORAL EVERY 12 HOURS SCHEDULED
Qty: 14 TABLET | Refills: 0 | Status: SHIPPED | OUTPATIENT
Start: 2021-05-11 | End: 2021-05-18

## 2021-05-11 NOTE — PROGRESS NOTES
Assessment Wicho Glez was seen today for possible infection  Diagnoses and all orders for this visit:    BV (bacterial vaginosis)  -     Discontinue: metroNIDAZOLE (FLAGYL) 500 mg tablet; Take 1 tablet (500 mg total) by mouth every 12 (twelve) hours for 7 days    High risk heterosexual behavior  -     Chlamydia/GC amplified DNA by PCR         Plan;  KOH and wet mount done in office positive for clue cells , negative for hyphae and trichomonas   BV treatment discussed and medication sent to pharmacy  To keep next scheduled appointment for H&P for surgery     Darin Ortega is a 31 y o  female here for a problem visit  Patient is complaining of 1 week of increased vaginal discharge with strong odor   States she would be ovulating and this month has noted some bleeding as well with ovulation   There is no problem list on file for this patient  Gynecologic History  Patient's last menstrual period was 04/26/2021  The current method of family planning is none  No past medical history on file  No past surgical history on file  No family history on file    Social History     Socioeconomic History    Marital status: Single     Spouse name: Not on file    Number of children: Not on file    Years of education: Not on file    Highest education level: Not on file   Occupational History    Not on file   Social Needs    Financial resource strain: Not on file    Food insecurity     Worry: Not on file     Inability: Not on file    Transportation needs     Medical: Not on file     Non-medical: Not on file   Tobacco Use    Smoking status: Never Smoker    Smokeless tobacco: Never Used    Tobacco comment: Hookah   Substance and Sexual Activity    Alcohol use: Not Currently    Drug use: Not Currently    Sexual activity: Yes     Partners: Male   Lifestyle    Physical activity     Days per week: Not on file     Minutes per session: Not on file    Stress: Not on file Relationships    Social connections     Talks on phone: Not on file     Gets together: Not on file     Attends Confucianist service: Not on file     Active member of club or organization: Not on file     Attends meetings of clubs or organizations: Not on file     Relationship status: Not on file    Intimate partner violence     Fear of current or ex partner: Not on file     Emotionally abused: Not on file     Physically abused: Not on file     Forced sexual activity: Not on file   Other Topics Concern    Not on file   Social History Narrative    ** Merged History Encounter **          No Known Allergies    Current Outpatient Medications:     naproxen (NAPROSYN) 250 mg tablet, Take 1 tablet (250 mg total) by mouth 3 (three) times a day with meals, Disp: 30 tablet, Rfl: 1    metroNIDAZOLE (FLAGYL) 500 mg tablet, Take 1 tablet (500 mg total) by mouth every 12 (twelve) hours for 7 days, Disp: 14 tablet, Rfl: 0    Review of Systems  Constitutional :no fever, feels well, no tiredness, no recent weight gain or loss  ENT: no ear ache, no loss of hearing, no nosebleeds or nasal discharge, no sore throat or hoarseness  Cardiovascular: no complaints of slow or fast heart beat, no chest pain, no palpitations, no leg claudication or lower extremity edema  Respiratory: no complaints of shortness of shortness of breath, no PERAZA  Breasts:no complaints of breast pain, breast lump, or nipple discharge  Gastrointestinal: no complaints of abdominal pain, constipation, nausea, vomiting, or diarrhea or bloody stools  Genitourinary :  as noted in HPI  Musculoskeletal: no complaints of arthralgia, no myalgia, no joint swelling or stiffness, no limb pain or swelling    Integumentary: no complaints of skin rash or lesion, itching or dry skin  Neurological: no complaints of headache, no confusion, no numbness or tingling, no dizziness or fainting     Objective     /80   Wt 85 3 kg (188 lb)   LMP 04/26/2021   BMI 33 30 kg/m² General:   appears stated age, cooperative, alert normal mood and affect   Abdomen: soft, non-tender, without masses or organomegaly   Vulva: normal   Vagina: normal vagina, no discharge, exudate, lesion, or erythema   Urethra: normal   Cervix: scant pink discharge     Uterus: not examined   Adnexa: not evaluated   Lymphatic palpation of lymph nodes in neck, axilla, groin and/or other locations: no lymphadenopathy or masses noted   Skin normal skin turgor and no rashes     Psychiatric orientation to person, place, and time: normal  mood and affect: normal

## 2021-05-13 LAB
C TRACH DNA SPEC QL NAA+PROBE: NEGATIVE
N GONORRHOEA DNA SPEC QL NAA+PROBE: NEGATIVE

## 2021-05-14 ENCOUNTER — TELEPHONE (OUTPATIENT)
Dept: OBGYN CLINIC | Facility: MEDICAL CENTER | Age: 26
End: 2021-05-14

## 2021-05-14 NOTE — TELEPHONE ENCOUNTER
I contacted pt's insurance about upcoming sx  Effective 11/1/20 and active  Pt has a deductible of $2000 ($2000 met) and an oop of $4600 ($2347 30 met)  Pt will be covered at 80% until oop is met  No PA needed    Johanny Henriquez Ref# [id number]

## 2021-06-02 ENCOUNTER — OFFICE VISIT (OUTPATIENT)
Dept: OBGYN CLINIC | Facility: MEDICAL CENTER | Age: 26
End: 2021-06-02
Payer: COMMERCIAL

## 2021-06-02 VITALS — SYSTOLIC BLOOD PRESSURE: 135 MMHG | WEIGHT: 188 LBS | BODY MASS INDEX: 33.3 KG/M2 | DIASTOLIC BLOOD PRESSURE: 80 MMHG

## 2021-06-02 DIAGNOSIS — R10.2 PELVIC PAIN: Primary | ICD-10-CM

## 2021-06-02 DIAGNOSIS — N83.201 OVARIAN CYST, BILATERAL: ICD-10-CM

## 2021-06-02 DIAGNOSIS — N83.202 OVARIAN CYST, BILATERAL: ICD-10-CM

## 2021-06-02 DIAGNOSIS — N80.1 ENDOMETRIOMA OF OVARY: ICD-10-CM

## 2021-06-02 PROCEDURE — 99214 OFFICE O/P EST MOD 30 MIN: CPT | Performed by: OBSTETRICS & GYNECOLOGY

## 2021-06-17 RX ORDER — TOPIRAMATE 25 MG/1
TABLET ORAL
COMMUNITY
Start: 2021-06-14

## 2021-06-17 RX ORDER — IBUPROFEN 600 MG/1
TABLET ORAL EVERY 6 HOURS PRN
COMMUNITY

## 2021-06-17 NOTE — H&P (VIEW-ONLY)
Assessment James Mcgraw was seen today for physical exam     Diagnoses and all orders for this visit:    Pelvic pain    Endometrioma of ovary    Ovarian cyst, bilateral         Plan:  Prior to signing consent and as previously reviewed :   30 yo G0 with pelvic pain and bilateral ovarian endometriomas   We discussed surgical management in the form of diagnostic laparoscopy and ovarian cystectomy with possible oophorectomy   We discussed plan would be to preserve ovarian function given her age and parity  Aware that if bleeding intraop sometimes an oophorectomy is indicated   Risks of surgery discussed such as injury to surrounding organs ( such as bowel , bladder, major blood vessels, ureters , uterus, tubes and ovaries)   We discussed recovery time and pain management after surgery   We discussed in event oophorectomy necessary she would not be able to have children but could use donor eggs  Patient verbalized understanding   We also discussed possible need for laparotomy in event frozen pelvis in order to proceed safely   Her past hx is significant for  surgery at [facility] at that time (record reviewed) and endometriosis was diagnosed by laparoscopy at that time she had cystotomy performed   Alternatives to surgery we discussed Lupron vs birth control  States interested in surgical removal of endometriomas given pain       Subjective   Yomairajanet Adarsh Ortega is a 31 y o  female here for a H&P for surgery    Patient is complaining of pelvic pain on right more than left  States she had surgery for this in 2018 at [facility]  At that time was told cyst was drained   Since then she has experienced on and off pain for which at times she has had to miss work  There is no problem list on file for this patient  Gynecologic History  Patient's last menstrual period was 05/18/2021  The current method of family planning is none      Past Medical History:   Diagnosis Date    Bilateral ovarian cysts     Endometriosis      Past Surgical History:   Procedure Laterality Date    LAPAROSCOPIC OVARIAN CYSTECTOMY      WISDOM TOOTH EXTRACTION       History reviewed  No pertinent family history  Social History     Socioeconomic History    Marital status: Single     Spouse name: Not on file    Number of children: Not on file    Years of education: Not on file    Highest education level: Not on file   Occupational History    Not on file   Tobacco Use    Smoking status: Never Smoker    Smokeless tobacco: Never Used    Tobacco comment: Hookah   Vaping Use    Vaping Use: Never used   Substance and Sexual Activity    Alcohol use: Not Currently    Drug use: Not Currently    Sexual activity: Yes     Partners: Male   Other Topics Concern    Not on file   Social History Narrative    ** Merged History Encounter **          Social Determinants of Health     Financial Resource Strain:     Difficulty of Paying Living Expenses:    Food Insecurity:     Worried About Running Out of Food in the Last Year:     Ran Out of Food in the Last Year:    Transportation Needs:     Lack of Transportation (Medical):  Lack of Transportation (Non-Medical):    Physical Activity:     Days of Exercise per Week:     Minutes of Exercise per Session:    Stress:     Feeling of Stress :    Social Connections:     Frequency of Communication with Friends and Family:     Frequency of Social Gatherings with Friends and Family:     Attends [employer] Services:     Active Member of Clubs or Organizations:     Attends Club or Organization Meetings:     Marital Status:    Intimate Partner Violence:     Fear of Current or Ex-Partner:     Emotionally Abused:     Physically Abused:     Sexually Abused:       Allergies   Allergen Reactions    Latex Other (See Comments)     States "burns"       Current Outpatient Medications:     ibuprofen (MOTRIN) 600 mg tablet, Take by mouth every 6 (six) hours as needed for mild pain, Disp: , Rfl:    naproxen (NAPROSYN) 250 mg tablet, Take 1 tablet (250 mg total) by mouth 3 (three) times a day with meals (Patient not taking: Reported on 6/2/2021), Disp: 30 tablet, Rfl: 1    topiramate (TOPAMAX) 25 mg tablet, , Disp: , Rfl:     Review of Systems  Constitutional :no fever, feels well, no tiredness, no recent weight gain or loss  ENT: no ear ache, no loss of hearing, no nosebleeds or nasal discharge, no sore throat or hoarseness  Cardiovascular: no complaints of slow or fast heart beat, no chest pain, no palpitations, no leg claudication or lower extremity edema  Respiratory: no complaints of shortness of shortness of breath, no PERAZA  Breasts:no complaints of breast pain, breast lump, or nipple discharge  Gastrointestinal: no complaints of abdominal pain, constipation, nausea, vomiting, or diarrhea or bloody stools  Genitourinary : as noted in HPI  Musculoskeletal: no complaints of arthralgia, no myalgia, no joint swelling or stiffness, no limb pain or swelling  Integumentary: no complaints of skin rash or lesion, itching or dry skin  Neurological: no complaints of headache, no confusion, no numbness or tingling, no dizziness or fainting     Objective     /80   Wt 85 3 kg (188 lb)   LMP 05/18/2021   BMI 33 30 kg/m²     General:   appears stated age, cooperative, alert normal mood and affect   Neck: normal, supple,trachea midline, no masses   Heart: regular rate and rhythm, S1, S2 normal, no murmur, click, rub or gallop   Lungs: clear to auscultation bilaterally   Abdomen: soft, non-tender, without masses or organomegaly   Vulva: normal   Vagina: normal vagina, no discharge, exudate, lesion, or erythema   Urethra: normal   Cervix: Normal, no discharge     Uterus: normal size, contour, position, consistency, mobility, non-tender   Adnexa: fullness palpable on cul de sac mostly to patients right with limited mobility    Lymphatic palpation of lymph nodes in neck, axilla, groin and/or other locations: no lymphadenopathy or masses noted   Skin normal skin turgor and no rashes  Psychiatric orientation to person, place, and time: normal  mood and affect: normal   FINDINGS:     UTERUS: The uterus is anteverted and measures 8 2 x 3 1 x 5 1 cm in size   There is borderline thickening of the junctional zone measuring 10 mm   The endometrium is within normal limits measuring 8 mm in thickness        ADNEXA:     The right ovary measures 9 8 x 8 5 x 5 5 cm in the left ovary measures 7 4 x 6 3 x 5 2 cm in size   There are bilateral ovarian lesions measuring 8 2 x 5 1 x 8 9 cm on the right and 5 8 x 4 7 x 5 3 cm on the left side (series 11 images 55-90),   corresponding to the lesion seen on prior CT and ultrasound examinations   Both lesions demonstrate hyperintense T1-weighted and mildly hypointense T2-weighted signal (T2 shading)   Both lesions demonstrate thin enhancing internal septations, but no   papillary projections or enhancing solid components   These are most in keeping with bilateral ovarian endometriomas      BLADDER: Normal      PELVIC CAVITY:  No lymphadenopathy      BOWEL:  Unremarkable MRI appearance      OSSEOUS STRUCTURES:   No osseous destruction      VASCULAR STRUCTURES:  Visualized vasculature is normal      PELVIC WALL:  Small right sided Bartholin's gland cyst measuring 9 x8mm (series 6 image 35)     IMPRESSION:        1   Bilateral ovarian lesions measuring 8 2 x 5 1 x 8 9 cm on the right and 5 8 x 4 7 x 5 3 cm on the left side, in keeping with bilateral ovarian endometriomas      2   Borderline thickening of the uterine junctional zone measuring 10 mm

## 2021-06-17 NOTE — PRE-PROCEDURE INSTRUCTIONS
Pre-Surgery Instructions:   Medication Instructions    ibuprofen (MOTRIN) 600 mg tablet Instructed patient per Anesthesia Guidelines  All pre-op instructions reviewed as per [facility] w /pt verb understanding  Inst NPO post MN  Will be taking no meds on morning of sx  Instructed to avoid all ASA and OTC Vit/Supp 1 week prior to surgery and to avoid NSAIDs 3 days prior to surgery  Tylenol ok to take prn  Current hospital visitor & masking policy reviewed  Received pre-op showering instructions from surgeon office, has CHG, reviewed process at time of call  [facility] [place] to get her ordered preop testing done asap  Pt verbalized an understanding of all instructions reviewed and offers no concerns at this time

## 2021-06-17 NOTE — PROGRESS NOTES
Assessment Samantha Toure was seen today for physical exam     Diagnoses and all orders for this visit:    Pelvic pain    Endometrioma of ovary    Ovarian cyst, bilateral         Plan:  Prior to signing consent and as previously reviewed :   32 yo G0 with pelvic pain and bilateral ovarian endometriomas   We discussed surgical management in the form of diagnostic laparoscopy and ovarian cystectomy with possible oophorectomy   We discussed plan would be to preserve ovarian function given her age and parity  Aware that if bleeding intraop sometimes an oophorectomy is indicated   Risks of surgery discussed such as injury to surrounding organs ( such as bowel , bladder, major blood vessels, ureters , uterus, tubes and ovaries)   We discussed recovery time and pain management after surgery   We discussed in event oophorectomy necessary she would not be able to have children but could use donor eggs  Patient verbalized understanding   We also discussed possible need for laparotomy in event frozen pelvis in order to proceed safely   Her past hx is significant for  surgery at [facility] at that time (record reviewed) and endometriosis was diagnosed by laparoscopy at that time she had cystotomy performed   Alternatives to surgery we discussed Lupron vs birth control  States interested in surgical removal of endometriomas given pain       Subjective   Don Balbina Ortega is a 31 y o  female here for a H&P for surgery    Patient is complaining of pelvic pain on right more than left  States she had surgery for this in 2018 at [facility]  At that time was told cyst was drained   Since then she has experienced on and off pain for which at times she has had to miss work  There is no problem list on file for this patient  Gynecologic History  Patient's last menstrual period was 05/18/2021  The current method of family planning is none      Past Medical History:   Diagnosis Date    Bilateral ovarian cysts     Endometriosis      Past Surgical History:   Procedure Laterality Date    LAPAROSCOPIC OVARIAN CYSTECTOMY      WISDOM TOOTH EXTRACTION       History reviewed  No pertinent family history  Social History     Socioeconomic History    Marital status: Single     Spouse name: Not on file    Number of children: Not on file    Years of education: Not on file    Highest education level: Not on file   Occupational History    Not on file   Tobacco Use    Smoking status: Never Smoker    Smokeless tobacco: Never Used    Tobacco comment: Hookah   Vaping Use    Vaping Use: Never used   Substance and Sexual Activity    Alcohol use: Not Currently    Drug use: Not Currently    Sexual activity: Yes     Partners: Male   Other Topics Concern    Not on file   Social History Narrative    ** Merged History Encounter **          Social Determinants of Health     Financial Resource Strain:     Difficulty of Paying Living Expenses:    Food Insecurity:     Worried About Running Out of Food in the Last Year:     Ran Out of Food in the Last Year:    Transportation Needs:     Lack of Transportation (Medical):  Lack of Transportation (Non-Medical):    Physical Activity:     Days of Exercise per Week:     Minutes of Exercise per Session:    Stress:     Feeling of Stress :    Social Connections:     Frequency of Communication with Friends and Family:     Frequency of Social Gatherings with Friends and Family:     Attends [employer] Services:     Active Member of Clubs or Organizations:     Attends Club or Organization Meetings:     Marital Status:    Intimate Partner Violence:     Fear of Current or Ex-Partner:     Emotionally Abused:     Physically Abused:     Sexually Abused:       Allergies   Allergen Reactions    Latex Other (See Comments)     States "burns"       Current Outpatient Medications:     ibuprofen (MOTRIN) 600 mg tablet, Take by mouth every 6 (six) hours as needed for mild pain, Disp: , Rfl:    naproxen (NAPROSYN) 250 mg tablet, Take 1 tablet (250 mg total) by mouth 3 (three) times a day with meals (Patient not taking: Reported on 6/2/2021), Disp: 30 tablet, Rfl: 1    topiramate (TOPAMAX) 25 mg tablet, , Disp: , Rfl:     Review of Systems  Constitutional :no fever, feels well, no tiredness, no recent weight gain or loss  ENT: no ear ache, no loss of hearing, no nosebleeds or nasal discharge, no sore throat or hoarseness  Cardiovascular: no complaints of slow or fast heart beat, no chest pain, no palpitations, no leg claudication or lower extremity edema  Respiratory: no complaints of shortness of shortness of breath, no PERAZA  Breasts:no complaints of breast pain, breast lump, or nipple discharge  Gastrointestinal: no complaints of abdominal pain, constipation, nausea, vomiting, or diarrhea or bloody stools  Genitourinary : as noted in HPI  Musculoskeletal: no complaints of arthralgia, no myalgia, no joint swelling or stiffness, no limb pain or swelling  Integumentary: no complaints of skin rash or lesion, itching or dry skin  Neurological: no complaints of headache, no confusion, no numbness or tingling, no dizziness or fainting     Objective     /80   Wt 85 3 kg (188 lb)   LMP 05/18/2021   BMI 33 30 kg/m²     General:   appears stated age, cooperative, alert normal mood and affect   Neck: normal, supple,trachea midline, no masses   Heart: regular rate and rhythm, S1, S2 normal, no murmur, click, rub or gallop   Lungs: clear to auscultation bilaterally   Abdomen: soft, non-tender, without masses or organomegaly   Vulva: normal   Vagina: normal vagina, no discharge, exudate, lesion, or erythema   Urethra: normal   Cervix: Normal, no discharge     Uterus: normal size, contour, position, consistency, mobility, non-tender   Adnexa: fullness palpable on cul de sac mostly to patients right with limited mobility    Lymphatic palpation of lymph nodes in neck, axilla, groin and/or other locations: no lymphadenopathy or masses noted   Skin normal skin turgor and no rashes  Psychiatric orientation to person, place, and time: normal  mood and affect: normal   FINDINGS:     UTERUS: The uterus is anteverted and measures 8 2 x 3 1 x 5 1 cm in size   There is borderline thickening of the junctional zone measuring 10 mm   The endometrium is within normal limits measuring 8 mm in thickness        ADNEXA:     The right ovary measures 9 8 x 8 5 x 5 5 cm in the left ovary measures 7 4 x 6 3 x 5 2 cm in size   There are bilateral ovarian lesions measuring 8 2 x 5 1 x 8 9 cm on the right and 5 8 x 4 7 x 5 3 cm on the left side (series 11 images 55-90),   corresponding to the lesion seen on prior CT and ultrasound examinations   Both lesions demonstrate hyperintense T1-weighted and mildly hypointense T2-weighted signal (T2 shading)   Both lesions demonstrate thin enhancing internal septations, but no   papillary projections or enhancing solid components   These are most in keeping with bilateral ovarian endometriomas      BLADDER: Normal      PELVIC CAVITY:  No lymphadenopathy      BOWEL:  Unremarkable MRI appearance      OSSEOUS STRUCTURES:   No osseous destruction      VASCULAR STRUCTURES:  Visualized vasculature is normal      PELVIC WALL:  Small right sided Bartholin's gland cyst measuring 9 x8mm (series 6 image 35)     IMPRESSION:        1   Bilateral ovarian lesions measuring 8 2 x 5 1 x 8 9 cm on the right and 5 8 x 4 7 x 5 3 cm on the left side, in keeping with bilateral ovarian endometriomas      2   Borderline thickening of the uterine junctional zone measuring 10 mm

## 2021-06-20 ENCOUNTER — APPOINTMENT (OUTPATIENT)
Dept: LAB | Facility: HOSPITAL | Age: 26
End: 2021-06-20
Attending: OBSTETRICS & GYNECOLOGY
Payer: COMMERCIAL

## 2021-06-20 DIAGNOSIS — N80.9 ENDOMETRIOMA: ICD-10-CM

## 2021-06-20 DIAGNOSIS — Z01.818 PRE-OP TESTING: ICD-10-CM

## 2021-06-20 DIAGNOSIS — R10.2 PELVIC PAIN: ICD-10-CM

## 2021-06-20 LAB
ABO GROUP BLD: NORMAL
BASOPHILS # BLD AUTO: 0.02 THOUSANDS/ΜL (ref 0–0.1)
BASOPHILS NFR BLD AUTO: 0 % (ref 0–1)
BLD GP AB SCN SERPL QL: NEGATIVE
EOSINOPHIL # BLD AUTO: 0.13 THOUSAND/ΜL (ref 0–0.61)
EOSINOPHIL NFR BLD AUTO: 2 % (ref 0–6)
ERYTHROCYTE [DISTWIDTH] IN BLOOD BY AUTOMATED COUNT: 14.2 % (ref 11.6–15.1)
HCT VFR BLD AUTO: 37.6 % (ref 34.8–46.1)
HGB BLD-MCNC: 12.3 G/DL (ref 11.5–15.4)
IMM GRANULOCYTES # BLD AUTO: 0.02 THOUSAND/UL (ref 0–0.2)
IMM GRANULOCYTES NFR BLD AUTO: 0 % (ref 0–2)
LYMPHOCYTES # BLD AUTO: 1.56 THOUSANDS/ΜL (ref 0.6–4.47)
LYMPHOCYTES NFR BLD AUTO: 18 % (ref 14–44)
MCH RBC QN AUTO: 28.2 PG (ref 26.8–34.3)
MCHC RBC AUTO-ENTMCNC: 32.7 G/DL (ref 31.4–37.4)
MCV RBC AUTO: 86 FL (ref 82–98)
MONOCYTES # BLD AUTO: 0.52 THOUSAND/ΜL (ref 0.17–1.22)
MONOCYTES NFR BLD AUTO: 6 % (ref 4–12)
NEUTROPHILS # BLD AUTO: 6.26 THOUSANDS/ΜL (ref 1.85–7.62)
NEUTS SEG NFR BLD AUTO: 74 % (ref 43–75)
NRBC BLD AUTO-RTO: 0 /100 WBCS
PLATELET # BLD AUTO: 268 THOUSANDS/UL (ref 149–390)
PMV BLD AUTO: 11.5 FL (ref 8.9–12.7)
RBC # BLD AUTO: 4.36 MILLION/UL (ref 3.81–5.12)
RH BLD: POSITIVE
SPECIMEN EXPIRATION DATE: NORMAL
WBC # BLD AUTO: 8.51 THOUSAND/UL (ref 4.31–10.16)

## 2021-06-20 PROCEDURE — 86850 RBC ANTIBODY SCREEN: CPT

## 2021-06-20 PROCEDURE — 85025 COMPLETE CBC W/AUTO DIFF WBC: CPT

## 2021-06-20 PROCEDURE — 36415 COLL VENOUS BLD VENIPUNCTURE: CPT

## 2021-06-20 PROCEDURE — 86901 BLOOD TYPING SEROLOGIC RH(D): CPT

## 2021-06-20 PROCEDURE — 86900 BLOOD TYPING SEROLOGIC ABO: CPT

## 2021-06-22 ENCOUNTER — TELEPHONE (OUTPATIENT)
Dept: OBGYN CLINIC | Facility: MEDICAL CENTER | Age: 26
End: 2021-06-22

## 2021-06-22 ENCOUNTER — ANESTHESIA EVENT (OUTPATIENT)
Dept: PERIOP | Facility: HOSPITAL | Age: 26
End: 2021-06-22
Payer: COMMERCIAL

## 2021-06-23 ENCOUNTER — ANESTHESIA (OUTPATIENT)
Dept: PERIOP | Facility: HOSPITAL | Age: 26
End: 2021-06-23
Payer: COMMERCIAL

## 2021-06-23 ENCOUNTER — HOSPITAL ENCOUNTER (OUTPATIENT)
Facility: HOSPITAL | Age: 26
Setting detail: OUTPATIENT SURGERY
Discharge: HOME | End: 2021-06-23
Attending: OBSTETRICS & GYNECOLOGY | Admitting: OBSTETRICS & GYNECOLOGY
Payer: COMMERCIAL

## 2021-06-23 VITALS
DIASTOLIC BLOOD PRESSURE: 71 MMHG | OXYGEN SATURATION: 96 % | TEMPERATURE: 97.3 F | RESPIRATION RATE: 16 BRPM | HEIGHT: 63 IN | HEART RATE: 80 BPM | WEIGHT: 188 LBS | BODY MASS INDEX: 33.31 KG/M2 | SYSTOLIC BLOOD PRESSURE: 124 MMHG

## 2021-06-23 DIAGNOSIS — Z98.890 S/P LAPAROSCOPY: ICD-10-CM

## 2021-06-23 DIAGNOSIS — Z01.818 PRE-OP TESTING: Primary | ICD-10-CM

## 2021-06-23 PROBLEM — N80.9 ENDOMETRIOSIS: Status: ACTIVE | Noted: 2021-06-23

## 2021-06-23 PROBLEM — N80.129 ENDOMETRIOMA: Status: ACTIVE | Noted: 2021-06-23

## 2021-06-23 PROBLEM — N83.209 OVARIAN CYST: Status: ACTIVE | Noted: 2021-06-23

## 2021-06-23 PROBLEM — N80.9 ENDOMETRIOMA: Status: ACTIVE | Noted: 2021-06-23

## 2021-06-23 LAB
ABO GROUP BLD: NORMAL
BASOPHILS # BLD AUTO: 0.02 THOUSANDS/ΜL (ref 0–0.1)
BASOPHILS NFR BLD AUTO: 0 % (ref 0–1)
EOSINOPHIL # BLD AUTO: 0.1 THOUSAND/ΜL (ref 0–0.61)
EOSINOPHIL NFR BLD AUTO: 1 % (ref 0–6)
ERYTHROCYTE [DISTWIDTH] IN BLOOD BY AUTOMATED COUNT: 14.1 % (ref 11.6–15.1)
EXT PREGNANCY TEST URINE: NEGATIVE
EXT. CONTROL: NORMAL
HCT VFR BLD AUTO: 36.5 % (ref 34.8–46.1)
HGB BLD-MCNC: 11.8 G/DL (ref 11.5–15.4)
IMM GRANULOCYTES # BLD AUTO: 0.01 THOUSAND/UL (ref 0–0.2)
IMM GRANULOCYTES NFR BLD AUTO: 0 % (ref 0–2)
LYMPHOCYTES # BLD AUTO: 1.26 THOUSANDS/ΜL (ref 0.6–4.47)
LYMPHOCYTES NFR BLD AUTO: 17 % (ref 14–44)
MCH RBC QN AUTO: 27.9 PG (ref 26.8–34.3)
MCHC RBC AUTO-ENTMCNC: 32.3 G/DL (ref 31.4–37.4)
MCV RBC AUTO: 86 FL (ref 82–98)
MONOCYTES # BLD AUTO: 0.59 THOUSAND/ΜL (ref 0.17–1.22)
MONOCYTES NFR BLD AUTO: 8 % (ref 4–12)
NEUTROPHILS # BLD AUTO: 5.46 THOUSANDS/ΜL (ref 1.85–7.62)
NEUTS SEG NFR BLD AUTO: 74 % (ref 43–75)
NRBC BLD AUTO-RTO: 0 /100 WBCS
PLATELET # BLD AUTO: 275 THOUSANDS/UL (ref 149–390)
PMV BLD AUTO: 10.9 FL (ref 8.9–12.7)
RBC # BLD AUTO: 4.23 MILLION/UL (ref 3.81–5.12)
RH BLD: POSITIVE
WBC # BLD AUTO: 7.44 THOUSAND/UL (ref 4.31–10.16)

## 2021-06-23 PROCEDURE — 81025 URINE PREGNANCY TEST: CPT | Performed by: OBSTETRICS & GYNECOLOGY

## 2021-06-23 PROCEDURE — 49322 LAPAROSCOPY ASPIRATION: CPT | Performed by: OBSTETRICS & GYNECOLOGY

## 2021-06-23 PROCEDURE — 85025 COMPLETE CBC W/AUTO DIFF WBC: CPT | Performed by: OBSTETRICS & GYNECOLOGY

## 2021-06-23 PROCEDURE — 58350 REOPEN FALLOPIAN TUBE: CPT | Performed by: OBSTETRICS & GYNECOLOGY

## 2021-06-23 RX ORDER — NEOSTIGMINE METHYLSULFATE 1 MG/ML
INJECTION INTRAVENOUS AS NEEDED
Status: DISCONTINUED | OUTPATIENT
Start: 2021-06-23 | End: 2021-06-23

## 2021-06-23 RX ORDER — ACETAMINOPHEN 325 MG/1
975 TABLET ORAL EVERY 6 HOURS PRN
Status: DISCONTINUED | OUTPATIENT
Start: 2021-06-23 | End: 2021-06-23 | Stop reason: HOSPADM

## 2021-06-23 RX ORDER — KETOROLAC TROMETHAMINE 30 MG/ML
INJECTION, SOLUTION INTRAMUSCULAR; INTRAVENOUS AS NEEDED
Status: DISCONTINUED | OUTPATIENT
Start: 2021-06-23 | End: 2021-06-23

## 2021-06-23 RX ORDER — SODIUM CHLORIDE 9 MG/ML
INJECTION, SOLUTION INTRAVENOUS CONTINUOUS PRN
Status: DISCONTINUED | OUTPATIENT
Start: 2021-06-23 | End: 2021-06-23

## 2021-06-23 RX ORDER — FENTANYL CITRATE 50 UG/ML
INJECTION, SOLUTION INTRAMUSCULAR; INTRAVENOUS AS NEEDED
Status: DISCONTINUED | OUTPATIENT
Start: 2021-06-23 | End: 2021-06-23

## 2021-06-23 RX ORDER — BUPIVACAINE HYDROCHLORIDE 2.5 MG/ML
INJECTION, SOLUTION EPIDURAL; INFILTRATION; INTRACAUDAL AS NEEDED
Status: DISCONTINUED | OUTPATIENT
Start: 2021-06-23 | End: 2021-06-23 | Stop reason: HOSPADM

## 2021-06-23 RX ORDER — SODIUM CHLORIDE 9 MG/ML
125 INJECTION, SOLUTION INTRAVENOUS CONTINUOUS
Status: DISCONTINUED | OUTPATIENT
Start: 2021-06-23 | End: 2021-06-23 | Stop reason: HOSPADM

## 2021-06-23 RX ORDER — PROPOFOL 10 MG/ML
INJECTION, EMULSION INTRAVENOUS AS NEEDED
Status: DISCONTINUED | OUTPATIENT
Start: 2021-06-23 | End: 2021-06-23

## 2021-06-23 RX ORDER — ROCURONIUM BROMIDE 10 MG/ML
INJECTION, SOLUTION INTRAVENOUS AS NEEDED
Status: DISCONTINUED | OUTPATIENT
Start: 2021-06-23 | End: 2021-06-23

## 2021-06-23 RX ORDER — ALBUTEROL SULFATE 2.5 MG/3ML
2.5 SOLUTION RESPIRATORY (INHALATION) ONCE AS NEEDED
Status: DISCONTINUED | OUTPATIENT
Start: 2021-06-23 | End: 2021-06-23 | Stop reason: HOSPADM

## 2021-06-23 RX ORDER — CEFAZOLIN SODIUM 2 G/50ML
SOLUTION INTRAVENOUS AS NEEDED
Status: DISCONTINUED | OUTPATIENT
Start: 2021-06-23 | End: 2021-06-23

## 2021-06-23 RX ORDER — ONDANSETRON 2 MG/ML
INJECTION INTRAMUSCULAR; INTRAVENOUS AS NEEDED
Status: DISCONTINUED | OUTPATIENT
Start: 2021-06-23 | End: 2021-06-23

## 2021-06-23 RX ORDER — DEXAMETHASONE SODIUM PHOSPHATE 4 MG/ML
INJECTION, SOLUTION INTRA-ARTICULAR; INTRALESIONAL; INTRAMUSCULAR; INTRAVENOUS; SOFT TISSUE AS NEEDED
Status: DISCONTINUED | OUTPATIENT
Start: 2021-06-23 | End: 2021-06-23

## 2021-06-23 RX ORDER — SODIUM CHLORIDE, SODIUM LACTATE, POTASSIUM CHLORIDE, CALCIUM CHLORIDE 600; 310; 30; 20 MG/100ML; MG/100ML; MG/100ML; MG/100ML
INJECTION, SOLUTION INTRAVENOUS CONTINUOUS PRN
Status: DISCONTINUED | OUTPATIENT
Start: 2021-06-23 | End: 2021-06-23

## 2021-06-23 RX ORDER — HYDROMORPHONE HCL 110MG/55ML
PATIENT CONTROLLED ANALGESIA SYRINGE INTRAVENOUS AS NEEDED
Status: DISCONTINUED | OUTPATIENT
Start: 2021-06-23 | End: 2021-06-23

## 2021-06-23 RX ORDER — OXYCODONE HYDROCHLORIDE 5 MG/1
5 TABLET ORAL EVERY 4 HOURS PRN
Qty: 10 TABLET | Refills: 0 | Status: SHIPPED | OUTPATIENT
Start: 2021-06-23 | End: 2021-07-03

## 2021-06-23 RX ORDER — MAGNESIUM HYDROXIDE 1200 MG/15ML
LIQUID ORAL AS NEEDED
Status: DISCONTINUED | OUTPATIENT
Start: 2021-06-23 | End: 2021-06-23 | Stop reason: HOSPADM

## 2021-06-23 RX ORDER — FENTANYL CITRATE/PF 50 MCG/ML
25 SYRINGE (ML) INJECTION
Status: DISCONTINUED | OUTPATIENT
Start: 2021-06-23 | End: 2021-06-23 | Stop reason: HOSPADM

## 2021-06-23 RX ORDER — SODIUM CHLORIDE, SODIUM LACTATE, POTASSIUM CHLORIDE, CALCIUM CHLORIDE 600; 310; 30; 20 MG/100ML; MG/100ML; MG/100ML; MG/100ML
125 INJECTION, SOLUTION INTRAVENOUS CONTINUOUS
Status: DISCONTINUED | OUTPATIENT
Start: 2021-06-23 | End: 2021-06-23 | Stop reason: HOSPADM

## 2021-06-23 RX ORDER — GLYCOPYRROLATE 0.2 MG/ML
INJECTION INTRAMUSCULAR; INTRAVENOUS AS NEEDED
Status: DISCONTINUED | OUTPATIENT
Start: 2021-06-23 | End: 2021-06-23

## 2021-06-23 RX ORDER — MIDAZOLAM HYDROCHLORIDE 2 MG/2ML
INJECTION, SOLUTION INTRAMUSCULAR; INTRAVENOUS AS NEEDED
Status: DISCONTINUED | OUTPATIENT
Start: 2021-06-23 | End: 2021-06-23

## 2021-06-23 RX ORDER — OXYCODONE HYDROCHLORIDE 5 MG/1
5 TABLET ORAL EVERY 4 HOURS PRN
Status: DISCONTINUED | OUTPATIENT
Start: 2021-06-23 | End: 2021-06-23 | Stop reason: HOSPADM

## 2021-06-23 RX ADMIN — OXYCODONE HYDROCHLORIDE 5 MG: 5 TABLET ORAL at 16:45

## 2021-06-23 RX ADMIN — PROPOFOL 200 MG: 10 INJECTION, EMULSION INTRAVENOUS at 13:17

## 2021-06-23 RX ADMIN — SODIUM CHLORIDE, SODIUM LACTATE, POTASSIUM CHLORIDE, AND CALCIUM CHLORIDE: .6; .31; .03; .02 INJECTION, SOLUTION INTRAVENOUS at 14:59

## 2021-06-23 RX ADMIN — NEOSTIGMINE METHYLSULFATE 4 MG: 1 INJECTION INTRAVENOUS at 15:02

## 2021-06-23 RX ADMIN — SODIUM CHLORIDE 125 ML/HR: 0.9 INJECTION, SOLUTION INTRAVENOUS at 12:46

## 2021-06-23 RX ADMIN — ONDANSETRON 4 MG: 2 INJECTION INTRAMUSCULAR; INTRAVENOUS at 14:59

## 2021-06-23 RX ADMIN — CEFAZOLIN SODIUM 2000 MG: 2 SOLUTION INTRAVENOUS at 14:42

## 2021-06-23 RX ADMIN — HYDROMORPHONE HYDROCHLORIDE 0.5 MG: 2 INJECTION INTRAMUSCULAR; INTRAVENOUS; SUBCUTANEOUS at 14:37

## 2021-06-23 RX ADMIN — DEXAMETHASONE SODIUM PHOSPHATE 8 MG: 4 INJECTION INTRA-ARTICULAR; INTRALESIONAL; INTRAMUSCULAR; INTRAVENOUS; SOFT TISSUE at 13:32

## 2021-06-23 RX ADMIN — GLYCOPYRROLATE 0.6 MG: 0.2 INJECTION, SOLUTION INTRAMUSCULAR; INTRAVENOUS at 15:02

## 2021-06-23 RX ADMIN — FENTANYL CITRATE 50 MCG: 50 INJECTION INTRAMUSCULAR; INTRAVENOUS at 13:48

## 2021-06-23 RX ADMIN — MIDAZOLAM 2 MG: 1 INJECTION INTRAMUSCULAR; INTRAVENOUS at 13:11

## 2021-06-23 RX ADMIN — ROCURONIUM BROMIDE 50 MG: 10 INJECTION, SOLUTION INTRAVENOUS at 13:17

## 2021-06-23 RX ADMIN — ROCURONIUM BROMIDE 20 MG: 10 INJECTION, SOLUTION INTRAVENOUS at 14:11

## 2021-06-23 RX ADMIN — Medication 100 MG: at 13:17

## 2021-06-23 RX ADMIN — FENTANYL CITRATE 50 MCG: 50 INJECTION INTRAMUSCULAR; INTRAVENOUS at 15:12

## 2021-06-23 RX ADMIN — KETOROLAC TROMETHAMINE 30 MG: 30 INJECTION, SOLUTION INTRAMUSCULAR at 15:05

## 2021-06-23 RX ADMIN — FENTANYL CITRATE 100 MCG: 50 INJECTION INTRAMUSCULAR; INTRAVENOUS at 13:17

## 2021-06-23 RX ADMIN — SODIUM CHLORIDE: 0.9 INJECTION, SOLUTION INTRAVENOUS at 13:20

## 2021-06-23 NOTE — ANESTHESIA PREPROCEDURE EVALUATION
Procedure:  DX LAP (N/A Uterus)  CYSTECTOMY OVARIAN, LAP (Bilateral Uterus)  OOPHORECTOMY, LAP (Bilateral Uterus)    Relevant Problems   Other   (+) Endometriosis   (+) Ovarian cyst        Physical Exam    Airway    Mallampati score: II  TM Distance: >3 FB  Neck ROM: full     Dental   No notable dental hx     Cardiovascular  Rhythm: regular, Rate: normal, Cardiovascular exam normal    Pulmonary  Pulmonary exam normal Breath sounds clear to auscultation,     Other Findings        Anesthesia Plan  ASA Score- 2     Anesthesia Type- general with ASA Monitors  Additional Monitors:   Airway Plan: ETT  Plan Factors-Exercise tolerance (METS): >4 METS  Patient is not a current smoker  Patient did not smoke on day of surgery  Induction- intravenous  Postoperative Plan- Plan for postoperative opioid use  Planned trial extubation    Informed Consent- Anesthetic plan and risks discussed with patient

## 2021-06-23 NOTE — INTERVAL H&P NOTE
H&P reviewed  After examining the patient I find no changes in the patients condition since the H&P had been written      Vitals:    06/23/21 1206   BP: 110/67   Pulse: 81   Resp: 16   Temp: 99 °F (37 2 °C)   SpO2: 95%

## 2021-06-23 NOTE — ANESTHESIA POSTPROCEDURE EVALUATION
Post-Op Assessment Note    CV Status:  Stable    Pain management: adequate     Mental Status:  Alert and awake   Hydration Status:  Euvolemic   PONV Controlled:  Controlled   Airway Patency:  Patent  Airway: intubated      Post Op Vitals Reviewed: Yes      Staff: Anesthesiologist, CRNA         No complications documented      BP      Temp     Pulse     Resp      SpO2

## 2021-06-23 NOTE — DISCHARGE INSTRUCTIONS
Exploratory Laparoscopy   WHAT YOU NEED TO KNOW:   Exploratory laparoscopy is surgery to look for causes of pain, abnormal growths, bleeding, or disease in your abdomen  During this surgery, small incisions are made in your abdomen  A small scope and tools are inserted through these incisions  A scope is a flexible tube with a light and camera on the end  DISCHARGE INSTRUCTIONS:   Medicines:   · Antibiotics: This medicine is given to fight or prevent an infection caused by bacteria  · Pain medicine: You may be given a prescription medicine to decrease pain  Do not wait until the pain is severe before you take this medicine  · Take your medicine as directed  Contact your healthcare provider if you think your medicine is not helping or if you have side effects  Tell him or her if you are allergic to any medicine  Keep a list of the medicines, vitamins, and herbs you take  Include the amounts, and when and why you take them  Bring the list or the pill bottles to follow-up visits  Carry your medicine list with you in case of an emergency  Follow up with your healthcare provider or surgeon as directed: You may need to return to have your stitches removed  Write down your questions so you remember to ask them during your visits  Wound care:   · Follow your healthcare provider or surgeon's instructions: You may need to keep the bandages on your incisions for 1 to 2 days or until your follow-up visit  After your follow-up visit, you may need to change your bandage 1 to 2 times a day  · Wash your hands:  Use soap and warm water to wash your hands  Do this before and after you care for your wound  Hand washing helps prevent an infection  · Remove your bandages gently:  If the bandage sticks to your wound, use warm water on the bandage and lift it off slowly  Lift the edges toward the center of your wound  Carefully wash around the wound with soap and water   Try not to get soap and water directly on your wound  Dry the area and put on new, clean bandages as directed  Change your bandages when they get wet or dirty  Ask how to clean your wounds after your stitches are removed  Self-care:   · Pain:  You may have neck or shoulder pain from gas used during your surgery  Rest and use heat on your shoulder to help decrease the pain  You may be more comfortable if you elevate your head and shoulders on several pillows  · Rest:  You may feel like resting more after your surgery  Slowly start to do more each day  Rest when you feel it is needed  · Prevent constipation:  High-fiber foods, extra liquids, and regular exercise can help you prevent constipation  Examples of high-fiber foods are fruit and bran  Prune juice and water are good liquids to drink  Regular exercise helps your digestive system work  You may also be told to take over-the-counter fiber and stool softener medicines  Take these items as directed  · Vaginal bleeding: You may have vaginal bleeding for a day or two if your laparoscopy was done for a female problem  Ask when you can bathe:  Ask your healthcare provider when you can take a shower or bath  Contact your healthcare provider or surgeon if:   · You have a fever  · You have pain in your abdomen or shoulder that does not go away after 2 days or gets worse  · You have more vaginal bleeding or discharge than you expected  · You have trouble having a bowel movement, or have diarrhea often  · You have repeated vomiting  · You have chills, a cough, or feel weak and achy  · Your stitches are swollen, red, or have pus coming from them  · You have questions or concerns about your condition or care  Seek care immediately or call 911 if:   · Your incisions come apart  · Your incisions bleed, or blood soaks through your bandage  · Your arm or leg feels warm, tender, and painful  It may look swollen and red      · You suddenly feel lightheaded and short of breath  · You have chest pain when you take a deep breath or cough  You may cough up blood  © Copyright [employer] 2018 Information is for End User's use only and may not be sold, redistributed or otherwise used for commercial purposes  All illustrations and images included in CareNotes® are the copyrighted property of A D A M [employer] Inc  or Evelina Bentley  The above information is an  only  It is not intended as medical advice for individual conditions or treatments  Talk to your doctor, nurse or pharmacist before following any medical regimen to see if it is safe and effective for you  Endometriosis   WHAT YOU NEED TO KNOW:   Endometriosis is a condition in which tissue that is normally only in your uterus grows outside of the uterus  Endometriosis causes tissue that should be shed during a monthly period to grow on your ovaries, fallopian tubes, bladder, or other organs  Organs and tissue may stick together and cause inflammation and pain  DISCHARGE INSTRUCTIONS:   Seek care immediately if:   · You have severe pain that does not go away after you take pain medicine  Contact your healthcare provider if:   · Your symptoms return after treatment  · You have heavy or unusual vaginal bleeding  · You see blood in your urine or bowel movement  · You have questions or concerns about your condition or care  Medicines:   · Hormones  may help shrink endometrial tissue and decrease pain and inflammation  You may be given birth control pills, androgen hormones, or medicine that makes your body produce less of certain hormones  · Acetaminophen  decreases pain and is available without a doctor's order  Ask how much to take and how often to take it  Follow directions  Acetaminophen can cause liver damage if not taken correctly  · NSAIDs , such as ibuprofen, help decrease swelling, pain, and fever  This medicine is available with or without a doctor's order   NSAIDs can cause stomach bleeding or kidney problems in certain people  If you take blood thinner medicine, always ask your healthcare provider if NSAIDs are safe for you  Always read the medicine label and follow directions  · Take your medicine as directed  Contact your healthcare provider if you think your medicine is not helping or if you have side effects  Tell him or her if you are allergic to any medicine  Keep a list of the medicines, vitamins, and herbs you take  Include the amounts, and when and why you take them  Bring the list or the pill bottles to follow-up visits  Carry your medicine list with you in case of an emergency  Self-care:   · Apply heat  on your abdomen for 20 to 30 minutes every 2 hours for as many days as directed  Heat helps decrease pain and muscle spasms  · Exercise regularly  to help reduce symptoms, such as pain  Ask about the best exercise plan for you  For support and more information:   · [employer]  [address] [place] [address]  Phone: [phone]  Phone: 2- [phone]  Web Address: http://[employer]    Follow up with your healthcare provider as directed:  Write down your questions so you remember to ask them during your visits  © Copyright [employer] 2021 Information is for End User's use only and may not be sold, redistributed or otherwise used for commercial purposes  All illustrations and images included in CareNotes® are the copyrighted property of [employer]  or [facility] Candida Azevedo   The above information is an  only  It is not intended as medical advice for individual conditions or treatments  Talk to your doctor, nurse or pharmacist before following any medical regimen to see if it is safe and effective for you

## 2021-06-23 NOTE — OP NOTE
OPERATIVE REPORT  PATIENT NAME: Richelle Ortega    :  1995  MRN: [id number]  Pt Location: [facility] OR ROOM 02    SURGERY DATE: 2021    Surgeon(s) and Role:     Kathleen Lobo MD - Primary     * Willard Chambers MD - [address] [place] [address], DO - Fellow, Assisting    Preop Diagnosis:  Endometrioma [N80 9]  Pelvic pain [R10 2]    Post-Op Diagnosis Codes:     * Endometrioma [N80 9]     * Pelvic pain [R10 2]    Procedure(s) (LRB):  DX LAP, LYSIS OF ADHESIONS, RIGHT AND LEFT OVARIAN CYSTOTOMY (N/A)  CHROMOPERTUBATION (Bilateral)    Specimen(s):  * No specimens in log *    Estimated Blood Loss:   20 mL    Drains:  [REMOVED] NG/OG/Enteral Tube Orogastric Center mouth (Removed)   Number of days: 0       [REMOVED] Urethral Catheter Non-latex 16 Fr  (Removed)   Number of days: 0       Anesthesia Type:   General    Operative Indications:  Endometrioma [N80 9]  Pelvic pain [R10 2]    Operative Findings:  1  Bilateral Endometriomas  2  Obliterated posterior culdesac   3  Dense adhesions within the pelvic and lower abdominal cavity  4  Normal appearing uterus  5  Patent bilateral fallopian tubes- noted with chromopertubation    Complications:   None    Procedure and Technique:  Appropriate preoperative antibiotics chosen per ACOG guidelines were given  Bilateral SCDs were placed in the lower extremities for DVT prevention prior to the institution of anesthesia  No bladder, ureteral, viscus, or solid organ injury were noted at the end of the procedure  The patient was identified in the holding area by the operating room staff and attending physician  She was taken to the operating room where anesthesia was instituted without complications  She was placed in the dorsal lithotomy position with the legs in [address] [place] [address] with care taken to avoid excessive flexion or extension of her lower extremities  The patient was prepped and draped in the usual sterile fashion   A Alston catheter was inserted  Appropriate preoperative antibiotics chosen per ACOG guidelines were given  Bilateral SCDs were placed in the lower extremities for DVT prevention prior to the institution of anesthesia  No bladder, ureteral, viscus, or solid organ injury were noted at the end of the procedure  The patient was properly identified in the holding area by the operating room staff and attending physician  She was taken to the operating room where general anesthesia was administered without complications  She was placed in dorsal lithotomy with Vin stirrups with care taken to avoid excessive flexion or extension of her lower extremities  The patient was prepped and draped in normal sterile fashion  A time-out was performed  The patient was again properly identified by the operating room staff and the attending physician  Next, a KAYLIE manipulator was placed without complications  Next, a Alston catheter was placed  A 10 mm incision was made in the umbilicus  We inserted the 10 mm trocar under direct visualization with a 0-degree laparoscope  The abdomen was insufflated with CO2 gas until a pressure of 15 mmHg was reached  A survey of the abdominal cavity showed the above dictated findings  Dilute 0 25% Marcaine solution was injected in the left lower and right lower quadrants  Two additional 5 mm trocars were placed under direct visualization in the left lower quadrant and right lower quadrant under direct visualization, taking care to remain lateral to the inferior epigastric vessels  The patient was placed into steep trendelenburg positioning  A survey of the abdomen revealed dense adhesions and a large right ovarian endometrioma  Careful manipulation with blunt instrumentation was used to free the right ovarian endometrioma from the underlying tissue  A right ovarian cystotomy was was noted with gentle manipulation and dark cystic "chocolote colored" fluid was noted   The fluid was suctioned and the abdomen was copious irrigated  The right ovarian endometrioma was then resected away from the uterus and posterior culdesac  At this time the posterior cul-de-sac was noted to be frozen, with dense adhesions and obliteration of spaces secondary to bilateral endometriomas  A left cystectomy was attempted but an incidental cystotomy was performed  Again, dark "chocolate colored" cystic fluid was noted to extrude from the left ovarian endometrioma  The fluid was suctioned and abdomen/pelvis were copiously irrigated  Next chromopertubation was performed  Methylene blue was mixed 10u/100saline  Fluid was noted to extrude from bilateral fallopian tubes  The pelvis was inspected for continued bleeding and hemostasis was noted  After greater than 1 hour of sharp and blunt dissection, the decision was made to discontinue the efforts to free the ovarian endometriomas from the posterior and side walls secondary to the patients desire for future fertility and concerns for vascular compromise to the uterus or injury to the fallopian tubes  Copious irrigation was performed and we confirmed good hemostasis  Excess CO2 gas was released through the trocars prior to their removal  The skin incisions were closed with 4-0 Monocryl in a subcuticular fashion  Exofin was applied to the incisions  The Alston catheter was removed  The sponge, needle and instrument count were correct x 2  The patient tolerated the procedure well  She was awakened from anesthesia and transferred to the recovery room in stable condition  Dr Jessica Mayo was present for the entire procedure      Patient Disposition:  PACU     SIGNATURE: Becca Beckman DO  DATE: June 23, 2021  TIME: 3:37 PM

## 2021-06-30 ENCOUNTER — TELEPHONE (OUTPATIENT)
Dept: OBGYN CLINIC | Facility: MEDICAL CENTER | Age: 26
End: 2021-06-30

## 2021-06-30 NOTE — TELEPHONE ENCOUNTER
Had laparotomy and lysis of adhesions on 6/23/2021  C/O cramping that "comes and goes"  Has not taken any tylenol or motrin  Advised to try tylenol or motrin and notify office if no relief    Advised to go to ED for severe pain

## 2021-06-30 NOTE — TELEPHONE ENCOUNTER
Pt called stating she has cramping in her lower pelvic area shooting down her leg  Surgery was on 06/23/2021  Please review

## 2021-07-13 ENCOUNTER — OFFICE VISIT (OUTPATIENT)
Dept: OBGYN CLINIC | Facility: CLINIC | Age: 26
End: 2021-07-13

## 2021-07-13 VITALS — SYSTOLIC BLOOD PRESSURE: 125 MMHG | WEIGHT: 183 LBS | BODY MASS INDEX: 32.42 KG/M2 | DIASTOLIC BLOOD PRESSURE: 70 MMHG

## 2021-07-13 DIAGNOSIS — Z98.890 S/P LAPAROSCOPY: ICD-10-CM

## 2021-07-13 DIAGNOSIS — N80.9 ENDOMETRIOSIS: ICD-10-CM

## 2021-07-13 DIAGNOSIS — N80.1 ENDOMETRIOMA OF OVARY: Primary | ICD-10-CM

## 2021-07-13 PROCEDURE — 99024 POSTOP FOLLOW-UP VISIT: CPT | Performed by: OBSTETRICS & GYNECOLOGY

## 2021-07-13 RX ORDER — ETONOGESTREL AND ETHINYL ESTRADIOL 11.7; 2.7 MG/1; MG/1
INSERT, EXTENDED RELEASE VAGINAL
Qty: 3 EACH | Refills: 3 | Status: SHIPPED | OUTPATIENT
Start: 2021-07-13

## 2021-07-13 NOTE — PROGRESS NOTES
Subjective     Félix Ortega is a 31 y o  female who presents to the clinic 3 weeks status post diagnostic laparoscopy for adnexal mass and endometriomas  Eating a regular diet without difficulty  Bowel movements are normal  The patient is not having any pain  The following portions of the patient's history were reviewed and updated as appropriate: allergies, current medications, past family history, past medical history, past social history, past surgical history and problem list     Review of Systems  Pertinent items are noted in HPI  Objective     /70   Wt 83 kg (183 lb)   LMP 06/16/2021   BMI 32 42 kg/m²   General:  alert and oriented, in no acute distress   Abdomen: soft, bowel sounds active, non-tender   Incision:   healing well, no drainage, no erythema, no hernia, no seroma, no swelling, no dehiscence, incision well approximated         Assessment      Doing well postoperatively  Operative findings again reviewed  Pathology report discussed  We reviewed pictures from surgery and treatment options      Plan     1  Continue any current medications  2  Wound care discussed  3  Activity restrictions: none  4  Anticipated return to work: now  5  Follow up: 4 months for birth control follow up  Today we discussed pregnancy - aware patent tubes , however despite patency higher risk of ectopic given severity of endometriosis   We also discussed continuous birth control vs lupron  States interested in nuvaring as she has friends that do well on this / risks and benefits reviewed    Medication sent to pharmacy

## 2021-12-29 ENCOUNTER — OFFICE VISIT (OUTPATIENT)
Dept: URGENT CARE | Age: 26
End: 2021-12-29

## 2021-12-29 VITALS — OXYGEN SATURATION: 99 % | RESPIRATION RATE: 20 BRPM | HEART RATE: 111 BPM | TEMPERATURE: 98.2 F

## 2021-12-29 DIAGNOSIS — N39.0 URINARY TRACT INFECTION WITH HEMATURIA, SITE UNSPECIFIED: Primary | ICD-10-CM

## 2021-12-29 DIAGNOSIS — R31.9 URINARY TRACT INFECTION WITH HEMATURIA, SITE UNSPECIFIED: Primary | ICD-10-CM

## 2021-12-29 DIAGNOSIS — R35.0 URINARY FREQUENCY: ICD-10-CM

## 2021-12-29 LAB
SL AMB  POCT GLUCOSE, UA: NEGATIVE
SL AMB LEUKOCYTE ESTERASE,UA: ABNORMAL
SL AMB POCT BILIRUBIN,UA: NEGATIVE
SL AMB POCT BLOOD,UA: ABNORMAL
SL AMB POCT CLARITY,UA: ABNORMAL
SL AMB POCT COLOR,UA: YELLOW
SL AMB POCT KETONES,UA: NEGATIVE
SL AMB POCT NITRITE,UA: NEGATIVE
SL AMB POCT PH,UA: 5
SL AMB POCT SPECIFIC GRAVITY,UA: 1.01
SL AMB POCT URINE PROTEIN: ABNORMAL
SL AMB POCT UROBILINOGEN: 0.2

## 2021-12-29 PROCEDURE — 87086 URINE CULTURE/COLONY COUNT: CPT

## 2021-12-29 PROCEDURE — 87077 CULTURE AEROBIC IDENTIFY: CPT

## 2021-12-29 PROCEDURE — 99213 OFFICE O/P EST LOW 20 MIN: CPT

## 2021-12-29 PROCEDURE — 87186 SC STD MICRODIL/AGAR DIL: CPT

## 2021-12-29 PROCEDURE — 81002 URINALYSIS NONAUTO W/O SCOPE: CPT

## 2021-12-29 RX ORDER — NITROFURANTOIN 25; 75 MG/1; MG/1
100 CAPSULE ORAL 2 TIMES DAILY
Qty: 10 CAPSULE | Refills: 0 | Status: SHIPPED | OUTPATIENT
Start: 2021-12-29 | End: 2022-01-03

## 2021-12-31 LAB
BACTERIA UR CULT: ABNORMAL
BACTERIA UR CULT: ABNORMAL

## 2023-12-01 ENCOUNTER — TELEPHONE (OUTPATIENT)
Age: 28
End: 2023-12-01

## 2024-04-04 ENCOUNTER — NURSE TRIAGE (OUTPATIENT)
Age: 29
End: 2024-04-04

## 2024-04-04 NOTE — TELEPHONE ENCOUNTER
"Pt reports pelvic pain back in 2021. States she had surgery with Dr. Mccarthy for cysts. [place] why she has not been seen since. States she had the same pain in December of last year. States the pain came back again a month ago. States it comes and goes about twice a week, lasts a few minutes at a time. 2/10 pain. Denies abnormal discharge, odor, fever. LMP last week. Pt given appt for tomorrow and is thankful.   Reason for Disposition   Abdominal pain is a chronic symptom (recurrent or ongoing AND lasting > 4 weeks)    Answer Assessment - Initial Assessment Questions  1. LOCATION: \"Where does it hurt?\"       RLQ  2. RADIATION: \"Does the pain shoot anywhere else?\" (e.g., chest, back)      denies  3. ONSET: \"When did the pain begin?\" (e.g., minutes, hours or days ago)       One month ago  4. SUDDEN: \"Gradual or sudden onset?\"      sudden  5. PATTERN \"Does the pain come and go, or is it constant?\"     - If constant: \"Is it getting better, staying the same, or worsening?\"       (Note: Constant means the pain never goes away completely; most serious pain is constant and it progresses)      - If intermittent: \"How long does it last?\" \"Do you have pain now?\"      (Note: Intermittent means the pain goes away completely between bouts)      Comes and goes  6. SEVERITY: \"How bad is the pain?\"  (e.g., Scale 1-10; mild, moderate, or severe)    - MILD (1-3): doesn't interfere with normal activities, abdomen soft and not tender to touch     - MODERATE (4-7): interferes with normal activities or awakens from sleep, tender to touch     - SEVERE (8-10): excruciating pain, doubled over, unable to do any normal activities       2/10  7. RECURRENT SYMPTOM: \"Have you ever had this type of stomach pain before?\" If Yes, ask: \"When was the last time?\" and \"What happened that time?\"       Yes back in 2021 and saw Dr. Mccarthy and again last december 8. CAUSE: \"What do you think is causing the stomach " "pain?\"      cysts  9. RELIEVING/AGGRAVATING FACTORS: \"What makes it better or worse?\" (e.g., movement, antacids, bowel movement)      nothing  10. OTHER SYMPTOMS: \"Has there been any vomiting, diarrhea, constipation, or urine problems?\"        denies  11. PREGNANCY: \"Is there any chance you are pregnant?\" \"When was your last menstrual period?\"        Denies, LMP one week ago    Protocols used: Abdominal Pain - Female-ADULT-[place]    "

## 2024-04-05 ENCOUNTER — OFFICE VISIT (OUTPATIENT)
Age: 29
End: 2024-04-05
Payer: COMMERCIAL

## 2024-04-05 VITALS
DIASTOLIC BLOOD PRESSURE: 82 MMHG | SYSTOLIC BLOOD PRESSURE: 128 MMHG | HEIGHT: 63 IN | WEIGHT: 191 LBS | BODY MASS INDEX: 33.84 KG/M2

## 2024-04-05 DIAGNOSIS — Z87.42 HX OF ENDOMETRIOSIS: ICD-10-CM

## 2024-04-05 DIAGNOSIS — R10.2 PELVIC PAIN: Primary | ICD-10-CM

## 2024-04-05 PROCEDURE — 99202 OFFICE O/P NEW SF 15 MIN: CPT | Performed by: OBSTETRICS & GYNECOLOGY

## 2024-04-05 NOTE — PROGRESS NOTES
Assessment Keri was seen today for pelvic pain.    Diagnoses and all orders for this visit:    Pelvic pain  -     US pelvis complete w transvaginal; Future    Hx of endometriosis  -     US pelvis complete w transvaginal; Future         Plan  Known endometriosis currently no on treatment (stopped nuvaring ). Us ordered and will have follow up after as well as yearly visit   We discussed initiation of  birth control for pain control but desires to await US report       Subjective   Keri Ortega is a 28 y.o. female here for a problem visit.  Patient is complaining of right sided pelvic pain for  about one month. States since she last saw me she is now   a . Had an uncomplicated vaginal delivery at [facility] . Since delivery she has no been on birth control and just recently started experiencing right sided pelvic pain   Given her  hx of endometriomas decided to be evaluated . States discomfort is manageable but still desired evaluation      Patient Active Problem List   Diagnosis    Ovarian cyst    Endometriosis    Endometrioma    S/P laparoscopy       Gynecologic History  No LMP recorded.  The current method of family planning is none.    Past Medical History:   Diagnosis Date    Bilateral ovarian cysts     Endometriosis      Past Surgical History:   Procedure Laterality Date    LAPAROSCOPIC OVARIAN CYSTECTOMY      [place] CHROMOTUBATION OVIDUCT W/MATERIALS Bilateral 2021    Procedure: CHROMOPERTUBATION;  Surgeon: Vanessa Mccarthy MD;  Location: [facility];  Service: Gynecology    AZ LAPS ABD PRTM&OMENTUM DX W/WO SPEC BR/WA SPX N/A 2021    Procedure: DX LAP, LYSIS OF ADHESIONS, RIGHT AND LEFT OVARIAN CYSTOTOMY;  Surgeon: Vanessa Mccarthy MD;  Location: [facility];  Service: Gynecology    WISDOM TOOTH EXTRACTION       No family history on file.  Social History     Socioeconomic History    Marital status: Single     Spouse name: Not on file    Number of children:  Not on file    Years of education: Not on file    Highest education level: Not on file   Occupational History    Not on file   Tobacco Use    Smoking status: Never    Smokeless tobacco: Never    Tobacco comments:     Hookah   Vaping Use    Vaping status: Never Used   Substance and Sexual Activity    Alcohol use: Not Currently    Drug use: Not Currently    Sexual activity: Yes     Partners: Male   Other Topics Concern    Not on file   Social History Narrative    ** Merged History Encounter **          Social Determinants of Health     Financial Resource Strain: High Risk (2/12/2023)    Received from [facility]    Overall Financial Resource Strain (CARDIA)     Difficulty of Paying Living Expenses: Hard   Food Insecurity: Food Insecurity Present (11/19/2023)    Received from [facility]    Hunger Vital Sign     Worried About Running Out of Food in the Last Year: Often true     Ran Out of Food in the Last Year: Sometimes true   Transportation Needs: No Transportation Needs (2/12/2023)    Received from [facility]    PRAPARE - Transportation     Lack of Transportation (Medical): No     Lack of Transportation (Non-Medical): No   Physical Activity: Unknown (2/12/2023)    Received from [facility]    Exercise Vital Sign     Days of Exercise per Week: Patient declined     Minutes of Exercise per Session: Patient declined   Stress: No Stress Concern Present (2/12/2023)    Received from [facility]    [employer] - Occupational Stress Questionnaire     Feeling of Stress : Not at all   Social Connections: Unknown (2/12/2023)    Received from [facility]    Social Connection and Isolation Panel [NHANES]     Frequency of Communication with Friends and Family: More than three times a week     Frequency of Social Gatherings with Friends and Family: Never     Attends Scientologist Services: Never     Active Member of Clubs or Organizations: No  "    Attends Club or Organization Meetings: Never     Marital Status: Patient declined   Intimate Partner Violence: Not At Risk (2/12/2023)    Received from [facility]    Humiliation, Afraid, Rape, and Kick questionnaire     Fear of Current or Ex-Partner: No     Emotionally Abused: No     Physically Abused: No     Sexually Abused: No   Housing Stability: Low Risk  (2/12/2023)    Received from [facility]    Housing Stability Vital Sign     Unable to Pay for Housing in the Last Year: No     Number of Places Lived in the Last Year: 2     Unstable Housing in the Last Year: No     Allergies   Allergen Reactions    Latex Other (See Comments)     States \"burns\"       Current Outpatient Medications:     etonogestrel-ethinyl estradiol (NUVARING) 0.12-0.015 MG/24HR vaginal ring, Insert vaginally and leave in place for 3 consecutive weeks, then remove for 1 week. (Patient not taking: Reported on 12/29/2021), Disp: 3 each, Rfl: 3    ibuprofen (MOTRIN) 600 mg tablet, Take by mouth every 6 (six) hours as needed for mild pain (Patient not taking: Reported on 12/29/2021), Disp: , Rfl:     naproxen (NAPROSYN) 250 mg tablet, Take 1 tablet (250 mg total) by mouth 3 (three) times a day with meals (Patient not taking: Reported on 12/29/2021), Disp: 30 tablet, Rfl: 1    topiramate (TOPAMAX) 25 mg tablet, , Disp: , Rfl:     Review of Systems  Constitutional :no fever, feels well, no tiredness, no recent weight gain or loss  ENT: no ear ache, no loss of hearing, no nosebleeds or nasal discharge, no sore throat or hoarseness.  Cardiovascular: no complaints of slow or fast heart beat, no chest pain, no palpitations, no leg claudication or lower extremity edema.  Respiratory: no complaints of shortness of shortness of breath, no PERAZA  Breasts:no complaints of breast pain, breast lump, or nipple discharge  Gastrointestinal: no complaints of abdominal pain, constipation, nausea, vomiting, or diarrhea or bloody " "stools  Genitourinary : as noted in HPI.  Musculoskeletal: no complaints of arthralgia, no myalgia, no joint swelling or stiffness, no limb pain or swelling.  Integumentary: no complaints of skin rash or lesion, itching or dry skin  Neurological: no complaints of headache, no confusion, no numbness or tingling, no dizziness or fainting     Objective     /82   Ht 5' 3\" (1.6 m)   Wt 86.6 kg (191 lb)   BMI 33.83 kg/m²     General:   appears stated age, cooperative, alert normal mood and affect   Lungs: Unlabored breathing    Skin normal skin turgor and no rashes.   Psychiatric orientation to person, place, and time: normal. mood and affect: normal      "

## 2024-04-23 ENCOUNTER — HOSPITAL ENCOUNTER (OUTPATIENT)
Dept: ULTRASOUND IMAGING | Facility: MEDICAL CENTER | Age: 29
Discharge: HOME | End: 2024-04-23
Payer: COMMERCIAL

## 2024-04-23 DIAGNOSIS — Z87.42 HX OF ENDOMETRIOSIS: ICD-10-CM

## 2024-04-23 DIAGNOSIS — R10.2 PELVIC PAIN: ICD-10-CM

## 2024-04-23 PROCEDURE — 76830 TRANSVAGINAL US NON-OB: CPT

## 2024-04-23 PROCEDURE — 76856 US EXAM PELVIC COMPLETE: CPT

## 2024-05-03 NOTE — RESULT ENCOUNTER NOTE
Please inform patient her  result of  US shows resolution of her  left ovarian endometrioma   She only has a small  2.3 cm endometrioma on the right   This is excellent news

## 2024-05-17 ENCOUNTER — HOSPITAL ENCOUNTER (EMERGENCY)
Facility: HOSPITAL | Age: 29
Discharge: HOME | End: 2024-05-17
Attending: EMERGENCY MEDICINE
Payer: COMMERCIAL

## 2024-05-17 VITALS
DIASTOLIC BLOOD PRESSURE: 65 MMHG | OXYGEN SATURATION: 98 % | BODY MASS INDEX: 34.44 KG/M2 | TEMPERATURE: 98.3 F | HEART RATE: 60 BPM | RESPIRATION RATE: 18 BRPM | WEIGHT: 194.45 LBS | SYSTOLIC BLOOD PRESSURE: 139 MMHG

## 2024-05-17 DIAGNOSIS — S02.5XXA FRACTURED TOOTH: ICD-10-CM

## 2024-05-17 DIAGNOSIS — K08.89 PAIN, DENTAL: Primary | ICD-10-CM

## 2024-05-17 DIAGNOSIS — K08.89 DENTALGIA: ICD-10-CM

## 2024-05-17 PROCEDURE — 99283 EMERGENCY DEPT VISIT LOW MDM: CPT

## 2024-05-17 PROCEDURE — 99284 EMERGENCY DEPT VISIT MOD MDM: CPT

## 2024-05-17 RX ORDER — AMOXICILLIN AND CLAVULANATE POTASSIUM 875; 125 MG/1; MG/1
1 TABLET, FILM COATED ORAL ONCE
Status: COMPLETED | OUTPATIENT
Start: 2024-05-17 | End: 2024-05-17

## 2024-05-17 RX ORDER — ACETAMINOPHEN 325 MG/1
975 TABLET ORAL ONCE
Status: COMPLETED | OUTPATIENT
Start: 2024-05-17 | End: 2024-05-17

## 2024-05-17 RX ORDER — KETOROLAC TROMETHAMINE 30 MG/ML
15 INJECTION, SOLUTION INTRAMUSCULAR; INTRAVENOUS ONCE
Status: DISCONTINUED | OUTPATIENT
Start: 2024-05-17 | End: 2024-05-17

## 2024-05-17 RX ORDER — LIDOCAINE HYDROCHLORIDE 20 MG/ML
15 SOLUTION OROPHARYNGEAL ONCE
Status: COMPLETED | OUTPATIENT
Start: 2024-05-17 | End: 2024-05-17

## 2024-05-17 RX ADMIN — LIDOCAINE HYDROCHLORIDE 15 ML: 20 SOLUTION ORAL at 23:10

## 2024-05-17 RX ADMIN — ACETAMINOPHEN 975 MG: 325 TABLET, FILM COATED ORAL at 23:08

## 2024-05-17 RX ADMIN — AMOXICILLIN AND CLAVULANATE POTASSIUM 1 TABLET: 875; 125 TABLET, FILM COATED ORAL at 23:09

## 2024-05-18 RX ORDER — AMOXICILLIN AND CLAVULANATE POTASSIUM 875; 125 MG/1; MG/1
1 TABLET, FILM COATED ORAL EVERY 12 HOURS
Qty: 14 TABLET | Refills: 0 | Status: SHIPPED | OUTPATIENT
Start: 2024-05-18 | End: 2024-05-25

## 2024-05-18 RX ORDER — NAPROXEN 500 MG/1
500 TABLET ORAL 2 TIMES DAILY WITH MEALS
Qty: 20 TABLET | Refills: 0 | Status: SHIPPED | OUTPATIENT
Start: 2024-05-18

## 2024-05-18 NOTE — ED PROVIDER NOTES
"History  Chief Complaint   Patient presents with    Dental Pain     Pt reports \"dental pain for 3 days, pain on lower right side, pt reports unable to close mouth correctly. Motrin 2hr pta\"     Patient is a 28 female presented ED with chief complaint of dental pain.  States that she has had dental pain in the past.  Patient has significant past medical history of bilateral ovarian cyst, endometriosis.  Patient stated that she had this tooth filled.  She stated that she feels as though the filling did weaken the edges of the tooth.  Patient stated that after the filling fell out of the back of the tooth fracture.  She located the affected tooth to the bottom right jaw.  Tooth #29.  Patient stated that she has been over-the-counter ibuprofen Tylenol with minimal relief.  Stated that she wanted to buy over-the-counter anesthetic for the tooth but still had minimal relief.  Patient denies any abscess or drainage in the back throat.  She does endorse pain with eating.  She states that the lower part of her jaw is also sore tooth.  Patient denies any chills, fevers, diaphoresis.  Denies any vomiting or diarrhea.  She denies any facial swelling or headaches..Patient denies any chest pain, shortness of breath, vomiting, diarrhea, chills, diaphoresis, fevers, loss of consciousness, syncope, urinary and bowel changes, abdominal pain, visual symptoms, vision loss, loss of function, loss of sensation, decreased oral intake, hemoptysis, hematochezia, hematemesis, melena, confusion.         Prior to Admission Medications   Prescriptions Last Dose Informant Patient Reported? Taking?   etonogestrel-ethinyl estradiol (NUVARING) 0.12-0.015 MG/24HR vaginal ring   No No   Sig: Insert vaginally and leave in place for 3 consecutive weeks, then remove for 1 week.   Patient not taking: Reported on 12/29/2021   ibuprofen (MOTRIN) 600 mg tablet   Yes No   Sig: Take by mouth every 6 (six) hours as needed for mild pain   Patient not taking: " Reported on 12/29/2021   naproxen (NAPROSYN) 250 mg tablet   No No   Sig: Take 1 tablet (250 mg total) by mouth 3 (three) times a day with meals   Patient not taking: Reported on 12/29/2021   topiramate (TOPAMAX) 25 mg tablet   Yes No   Patient not taking: Reported on 7/13/2021      Facility-Administered Medications: None       Past Medical History:   Diagnosis Date    Bilateral ovarian cysts     Endometriosis        Past Surgical History:   Procedure Laterality Date    LAPAROSCOPIC OVARIAN CYSTECTOMY      [place] CHROMOTUBATION OVIDUCT W/MATERIALS Bilateral 6/23/2021    Procedure: CHROMOPERTUBATION;  Surgeon: Vanessa Mccarthy MD;  [facility];  Service: Gynecology    [place] LAPS ABD PRTM&OMENTUM DX W/WO SPEC BR/WA SPX N/A 6/23/2021    Procedure: DX LAP, LYSIS OF ADHESIONS, RIGHT AND LEFT OVARIAN CYSTOTOMY;  Surgeon: Vanessa Mccarthy MD;  [facility];  Service: Gynecology    WISDOM TOOTH EXTRACTION         History reviewed. No pertinent family history.  I have reviewed and agree with the history as documented.    E-Cigarette/Vaping    E-Cigarette Use Never User      E-Cigarette/Vaping Substances    Nicotine No     THC No     CBD No     Flavoring No     Other No     Unknown No      Social History     Tobacco Use    Smoking status: Never    Smokeless tobacco: Never    Tobacco comments:     Hookah   Vaping Use    Vaping status: Never Used   Substance Use Topics    Alcohol use: Not Currently    Drug use: Not Currently       Review of Systems   Constitutional:  Negative for chills, diaphoresis, fatigue and fever.   HENT:  Positive for dental problem. Negative for congestion, ear discharge, ear pain, postnasal drip, rhinorrhea, sinus pressure, sinus pain and sore throat.    Eyes:  Negative for photophobia and visual disturbance.   Respiratory:  Negative for cough, chest tightness and shortness of breath.    Cardiovascular:  Negative for chest pain and palpitations.   Gastrointestinal:  Negative for  abdominal distention, abdominal pain, constipation, diarrhea, nausea and vomiting.   Genitourinary:  Negative for difficulty urinating, dysuria, flank pain, frequency and hematuria.   Musculoskeletal:  Negative for arthralgias, back pain, joint swelling, myalgias, neck pain and neck stiffness.   Skin:  Negative for rash and wound.   Neurological:  Negative for dizziness, tremors, syncope, facial asymmetry, weakness, light-headedness, numbness and headaches.       Physical Exam  Physical Exam  Constitutional:       General: She is not in acute distress.     Appearance: Normal appearance. She is not ill-appearing, toxic-appearing or diaphoretic.   HENT:      Head: Normocephalic.      Right Ear: External ear normal.      Left Ear: External ear normal.      Nose: Nose normal.      Mouth/Throat:      Mouth: Mucous membranes are moist.      Dentition: Abnormal dentition. Dental tenderness and dental caries present.      Pharynx: Oropharynx is clear.      Comments: There is a fracture to tooth #29.  No gingival swelling.  No abscess formation noted on exam.  Patient does have pain to percussion of the 29 tooth.  No cellulitic changes of the gumline.  Patient does have dental caries throughout the mouth.  Eyes:      General:         Right eye: No discharge.         Left eye: No discharge.      Conjunctiva/sclera: Conjunctivae normal.   Cardiovascular:      Rate and Rhythm: Normal rate and regular rhythm.   Pulmonary:      Effort: Pulmonary effort is normal. No respiratory distress.      Breath sounds: Normal breath sounds. No stridor. No wheezing, rhonchi or rales.   Chest:      Chest wall: No tenderness.   Abdominal:      General: Bowel sounds are normal.      Palpations: Abdomen is soft.      Tenderness: There is no abdominal tenderness. There is no right CVA tenderness or left CVA tenderness.   Musculoskeletal:         General: No tenderness. Normal range of motion.      Cervical back: Neck supple.   Skin:     General:  Skin is warm and dry.      Capillary Refill: Capillary refill takes less than 2 seconds.   Neurological:      Mental Status: She is alert and oriented to person, place, and time.   Psychiatric:         Mood and Affect: Mood normal.         Vital Signs  ED Triage Vitals [05/17/24 2200]   Temperature Pulse Respirations Blood Pressure SpO2   98.3 °F (36.8 °C) 60 18 139/65 98 %      Temp Source Heart Rate Source Patient Position - Orthostatic VS BP Location FiO2 (%)   Oral Monitor Sitting Right arm --      Pain Score       10 - Worst Possible Pain           Vitals:    05/17/24 2200   BP: 139/65   Pulse: 60   Patient Position - Orthostatic VS: Sitting         Visual Acuity      ED Medications  Medications   Lidocaine Viscous HCl (XYLOCAINE) 2 % mucosal solution 15 mL (15 mL Swish & Spit Given 5/17/24 2310)   amoxicillin-clavulanate (AUGMENTIN) 875-125 mg per tablet 1 tablet (1 tablet Oral Given 5/17/24 2309)   acetaminophen (TYLENOL) tablet 975 mg (975 mg Oral Given 5/17/24 2308)       Diagnostic Studies  Results Reviewed       None                   No orders to display              Procedures  Procedures         ED Course                                             Medical Decision Making  Patient is 28 female presented to ED with chief complaint dental pain.  Cardiopulmonary exam is benign.  Patient denies chills fever diaphoresis nausea vomiting.  On examination tooth #29 was fractured.  Patient stated that she did have worked on his tooth with a filling placed.  She states the feeling did fall out.  After this the tooth did fracture.  She denies any drainage or abscess formation.  No drainage or abscess formation noted on exam.  There is no gingival swelling noted on exam.  Patient was tender along the mandible on the right side.  Patient denies any headaches or fevers.  No facial swelling on exam.  No trismus.  Given viscous lidocaine Tylenol and Augmentin in the ED.  Patient given ambulatory referral to dental  "clinic.  Rest of antibiotics were sent to pharmacy.  Patient did leave med visits before being discharged.  Patient is in stable condition before leaving before discharge.    Ddx-dentalgia, fractured tooth, pulpitis, periapical abscess, periodontal disease, gingivitis    Portions of the record may have been created with voice recognition software. Occasional wrong word or \"sound a like\" substitutions may have occurred due to the inherent limitations of voice recognition software. Read the chart carefully and recognize, using context, where substitutions have occurred.       Amount and/or Complexity of Data Reviewed  Labs: ordered.     Details: Details and MDM    Risk  OTC drugs.  Prescription drug management.  Diagnosis or treatment significantly limited by social determinants of health.             Disposition  Final diagnoses:   Pain, dental   Dentalgia   Fractured tooth     Time reflects when diagnosis was documented in both MDM as applicable and the Disposition within this note       Time User Action Codes Description Comment    5/18/2024 12:24 AM Elmer Sevilla [K08.89] Pain, dental     5/18/2024 12:24 AM Elmer Sevilla [K08.89] Dentalgia     5/18/2024 12:24 AM Elmer Sevilla [S02.5XXA] Fractured tooth           ED Disposition       ED Disposition   Left from Room after Provider Exam    Condition   --    Date/Time   Sat May 18, 2024 12:28 AM    Comment   --             Follow-up Information    None         Discharge Medication List as of 5/17/2024 11:43 PM        CONTINUE these medications which have NOT CHANGED    Details   etonogestrel-ethinyl estradiol (NUVARING) 0.12-0.015 MG/24HR vaginal ring Insert vaginally and leave in place for 3 consecutive weeks, then remove for 1 week., Normal      ibuprofen (MOTRIN) 600 mg tablet Take by mouth every 6 (six) hours as needed for mild pain, Historical Med      naproxen (NAPROSYN) 250 mg tablet Take 1 tablet (250 mg total) by mouth 3 (three) times " a day with meals, Starting Tue 4/6/2021, Normal      topiramate (TOPAMAX) 25 mg tablet Starting Mon 6/14/2021, Historical Med                 PDMP Review       None            ED Provider  Electronically Signed by             Elmer Sevilla PA-C  05/18/24 0677

## 2024-05-18 NOTE — ED NOTES
Pt informed this RN she needed to leave, unable to wait for the provider, provider made aware.      Mary Anne Enriquez, NAVARRO  05/17/24 4141

## 2024-06-14 ENCOUNTER — ANNUAL EXAM (OUTPATIENT)
Dept: OBGYN CLINIC | Facility: MEDICAL CENTER | Age: 29
End: 2024-06-14
Payer: COMMERCIAL

## 2024-06-14 VITALS
HEIGHT: 63 IN | WEIGHT: 194 LBS | SYSTOLIC BLOOD PRESSURE: 120 MMHG | DIASTOLIC BLOOD PRESSURE: 70 MMHG | BODY MASS INDEX: 34.38 KG/M2

## 2024-06-14 DIAGNOSIS — N64.4 BREAST PAIN, LEFT: ICD-10-CM

## 2024-06-14 DIAGNOSIS — Z12.4 PAP SMEAR FOR CERVICAL CANCER SCREENING: Primary | ICD-10-CM

## 2024-06-14 DIAGNOSIS — N80.129 ENDOMETRIOMA OF OVARY: ICD-10-CM

## 2024-06-14 PROCEDURE — G0145 SCR C/V CYTO,THINLAYER,RESCR: HCPCS | Performed by: OBSTETRICS & GYNECOLOGY

## 2024-06-14 PROCEDURE — 99395 PREV VISIT EST AGE 18-39: CPT | Performed by: OBSTETRICS & GYNECOLOGY

## 2024-06-14 RX ORDER — ETONOGESTREL AND ETHINYL ESTRADIOL VAGINAL RING .015; .12 MG/D; MG/D
RING VAGINAL
Qty: 3 EACH | Refills: 3 | Status: SHIPPED | OUTPATIENT
Start: 2024-06-14

## 2024-06-14 NOTE — PROGRESS NOTES
ASSESSMENT & PLAN: Keri Ortega is a 28 y.o.  with normal gynecologic exam.    1.  Routine well woman exam done today  2.  Pap and HPV:  The patient's last pap was .    It was normal.    Pap was done today.    Current ASCCP Guidelines reviewed.   3.  The following were reviewed in today's visit: breast self exam, family planning choices, exercise, and healthy diet.  4. Endometrioma  - reviewed US / significantly smaller than before   Desires refill of nuvaring    5. Left breast pain and palpable nodule  -diagnostic US ordered      CC:  Annual Gynecologic Examination    HPI: Keri Ortega is a 28 y.o.  who presents for annual gynecologic examination.  She has the following concerns:  left breast pain and palpable lesion    Health Maintenance:    She wears her seatbelt routinely.    She does perform regular monthly self breast exams.    She feels safe at home.     Past Medical History:   Diagnosis Date    Bilateral ovarian cysts     Endometriosis        Past Surgical History:   Procedure Laterality Date    LAPAROSCOPIC OVARIAN CYSTECTOMY      OK CHROMOTUBATION OVIDUCT W/MATERIALS Bilateral 2021    Procedure: CHROMOPERTUBATION;  Surgeon: Vanessa Mccarthy MD;  Location: [facility];  Service: Gynecology    OK LAPS ABD PRTM&OMENTUM DX W/WO SPEC BR/WA SPX N/A 2021    Procedure: DX LAP, LYSIS OF ADHESIONS, RIGHT AND LEFT OVARIAN CYSTOTOMY;  Surgeon: Vanessa Mccarthy MD;  Location: [facility];  Service: Gynecology    WISDOM TOOTH EXTRACTION         Past OB/Gyn History:  OB History          1    Para   1    Term   1            AB        Living   1         SAB        IAB        Ectopic        Multiple        Live Births   1               History reviewed. No pertinent family history.    Social History:  Social History     Socioeconomic History    Marital status: Single     Spouse name: Not on file    Number of children: Not on file    Years  of education: Not on file    Highest education level: Not on file   Occupational History    Not on file   Tobacco Use    Smoking status: Never    Smokeless tobacco: Never    Tobacco comments:     Hookah   Vaping Use    Vaping status: Never Used   Substance and Sexual Activity    Alcohol use: Not Currently    Drug use: Not Currently    Sexual activity: Not Currently     Partners: Male   Other Topics Concern    Not on file   Social History Narrative    ** Merged History Encounter **          Social Determinants of Health     Financial Resource Strain: High Risk (2/12/2023)    Received from [facility]    Overall Financial Resource Strain (CARDIA)     Difficulty of Paying Living Expenses: Hard   Food Insecurity: Food Insecurity Present (11/19/2023)    Received from [facility]    Hunger Vital Sign     Worried About Running Out of Food in the Last Year: Often true     Ran Out of Food in the Last Year: Sometimes true   Transportation Needs: No Transportation Needs (2/12/2023)    Received from [facility]    PRAPARE - Transportation     Lack of Transportation (Medical): No     Lack of Transportation (Non-Medical): No   Physical Activity: Unknown (2/12/2023)    Received from [facility]    Exercise Vital Sign     Days of Exercise per Week: Patient declined     Minutes of Exercise per Session: Patient declined   Stress: No Stress Concern Present (2/12/2023)    Received from [facility]    [employer] [employer] - Occupational Stress Questionnaire     Feeling of Stress : Not at all   Social Connections: Unknown (2/12/2023)    Received from [facility]    Social Connection and Isolation Panel [NHANES]     Frequency of Communication with Friends and Family: More than three times a week     Frequency of Social  "Gatherings with Friends and Family: Never     Attends Taoist Services: Never     Active Member of Clubs or Organizations: No     Attends Club or Organization Meetings: Never     Marital Status: Patient declined   Intimate Partner Violence: Not At Risk (2/12/2023)    Received from [facility]    Humiliation, Afraid, Rape, and Kick questionnaire     Fear of Current or Ex-Partner: No     Emotionally Abused: No     Physically Abused: No     Sexually Abused: No   Housing Stability: Low Risk  (2/12/2023)    [facility]    Housing Stability Vital Sign     Unable to Pay for Housing in the Last Year: No     Number of Places Lived in the Last Year: 2     Unstable Housing in the Last Year: No       Allergies   Allergen Reactions    Latex Other (See Comments)     States \"burns\"         Current Outpatient Medications:     etonogestrel-ethinyl estradiol (NUVARING) 0.12-0.015 MG/24HR vaginal ring, Insert vaginally and leave in place for 3 consecutive weeks, then remove for 1 week., Disp: 3 each, Rfl: 3    ibuprofen (MOTRIN) 600 mg tablet, Take by mouth every 6 (six) hours as needed for mild pain (Patient not taking: Reported on 12/29/2021), Disp: , Rfl:     naproxen (NAPROSYN) 250 mg tablet, Take 1 tablet (250 mg total) by mouth 3 (three) times a day with meals (Patient not taking: Reported on 12/29/2021), Disp: 30 tablet, Rfl: 1    naproxen (Naprosyn) 500 mg tablet, Take 1 tablet (500 mg total) by mouth 2 (two) times a day with meals (Patient not taking: Reported on 6/14/2024), Disp: 20 tablet, Rfl: 0    topiramate (TOPAMAX) 25 mg tablet, , Disp: , Rfl:       Review of Systems  Constitutional :no fever, feels well, no tiredness, no recent weight gain or loss  ENT: no ear ache, no loss of hearing, no nosebleeds or nasal discharge, no sore throat or hoarseness.  Cardiovascular: no complaints of slow or fast heart beat, no chest pain, no " "palpitations, no leg claudication or lower extremity edema.  Respiratory: no complaints of shortness of shortness of breath, no PERAZA  Breasts:no complaints of breast pain, breast lump, or nipple discharge  Gastrointestinal: no complaints of abdominal pain, constipation, nausea, vomiting, or diarrhea or bloody stools  Genitourinary : no complaints of dysuria, incontinence, pelvic pain, no dysmenorrhea, vaginal discharge or abnormal vaginal bleeding and as noted in HPI.  Musculoskeletal: no complaints of arthralgia, no myalgia, no joint swelling or stiffness, no limb pain or swelling.  Integumentary: no complaints of skin rash or lesion, itching or dry skin  Neurological: no complaints of headache, no confusion, no numbness or tingling, no dizziness or fainting    Objective      /70   Ht 5' 3\" (1.6 m)   Wt 88 kg (194 lb)   LMP 05/14/2024 (Approximate)   Breastfeeding No   BMI 34.37 kg/m²   General:   appears stated age, cooperative, alert normal mood and affect   Lungs: Unlabored     Breasts: Left breat palpable movable mass on upper outer quadrant  / no skin lesions or  nipple retraction , normal right breast     Abdomen: soft, non-tender, without masses or organomegaly   Vulva: normal, normal female genitalia, Bartholin's, Urethra, Granite normal, no lesions, normal female hair distribution, no clitoral enlargement   Vagina: normal vagina, no discharge, exudate, lesion, or erythema   Urethra: normal   Cervix: Normal, no discharge. Nontender.   Uterus: normal size, contour, position, consistency, mobility, non-tender   Adnexa: no mass, fullness, tenderness   Psychiatric orientation to person, place, and time: normal. mood and affect: normal      "

## 2024-06-19 LAB
LAB AP GYN PRIMARY INTERPRETATION: NORMAL
Lab: NORMAL

## 2024-07-22 ENCOUNTER — VBI (OUTPATIENT)
Dept: ADMINISTRATIVE | Facility: OTHER | Age: 29
End: 2024-07-22

## 2024-07-22 NOTE — TELEPHONE ENCOUNTER
07/22/24 12:52 PM     Chart reviewed for Pap Smear (HPV) aka Cervical Cancer Screening was/were submitted to the patient's insurance.     Maryellen Lynch   [facility]

## 2024-12-05 DIAGNOSIS — N80.129 ENDOMETRIOMA OF OVARY: ICD-10-CM

## 2024-12-05 RX ORDER — ETONOGESTREL AND ETHINYL ESTRADIOL VAGINAL RING .015; .12 MG/D; MG/D
RING VAGINAL
Qty: 3 EACH | Refills: 1 | Status: SHIPPED | OUTPATIENT
Start: 2024-12-05

## 2024-12-05 NOTE — TELEPHONE ENCOUNTER
Reason for call:   [x] Refill   [] Prior Auth  [] Other:     Office:   [] PCP/Provider -   [x] Specialty/Provider - obgyn    Medication: nuvaring     Dose/Frequency: 0.12-0.015 insert vaginally and leave in place for 3 consecutive weeks, then remove for 1 week     Quantity: 3    [facility]     Does the patient have enough for 3 days?   [] Yes   [x] No - Send as HP to POD- pt stated she's out of them and doesn't have refills at pharmacy

## 2025-06-04 ENCOUNTER — VBI (OUTPATIENT)
Dept: ADMINISTRATIVE | Facility: OTHER | Age: 30
End: 2025-06-04

## 2025-06-04 NOTE — TELEPHONE ENCOUNTER
06/04/25 8:41 AM     Chart reviewed for Pap Smear (HPV) aka Cervical Cancer Screening was/were submitted to the patient's insurance.     Elizabeth Lopez MA   [facility]

## (undated) DEVICE — ENDOPATH 5MM CURVED SCISSORS WITH MONOPOLAR CAUTERY: Brand: ENDOPATH

## (undated) DEVICE — ADHESIVE SKIN HIGH VISCOSITY EXOFIN 1ML

## (undated) DEVICE — TROCAR: Brand: KII FIOS FIRST ENTRY

## (undated) DEVICE — ENDOPATH 5MM ENDOSCOPIC BLUNT TIP DISSECTORS (12 POUCHES CONTAINING 3 DISSECTORS EACH): Brand: ENDOPATH

## (undated) DEVICE — TROCAR: Brand: KII® SLEEVE

## (undated) DEVICE — INTENDED FOR TISSUE SEPARATION, AND OTHER PROCEDURES THAT REQUIRE A SHARP SURGICAL BLADE TO PUNCTURE OR CUT.: Brand: BARD-PARKER SAFETY BLADES SIZE 11, STERILE

## (undated) DEVICE — GLOVE SRG LF STRL BGL SKNSNS 5.5 PF

## (undated) DEVICE — BETHLEHEM UNIVERSAL GYN LAP PK: Brand: CARDINAL HEALTH

## (undated) DEVICE — TRAY FOLEY 16FR URIMETER SILICONE SURESTEP

## (undated) DEVICE — IRRIG ENDO FLO TUBING

## (undated) DEVICE — SUT MONOCRYL 4-0 PS-2 27 IN Y426H

## (undated) DEVICE — CHLORAPREP HI-LITE 26ML ORANGE

## (undated) DEVICE — GLOVE INDICATOR UNDERGLOVE SZ 6 BLUE

## (undated) DEVICE — PVC URETHRAL CATHETER: Brand: DOVER

## (undated) DEVICE — BLUE HEAT SCOPE WARMER

## (undated) DEVICE — [HIGH FLOW INSUFFLATOR,  DO NOT USE IF PACKAGE IS DAMAGED,  KEEP DRY,  KEEP AWAY FROM SUNLIGHT,  PROTECT FROM HEAT AND RADIOACTIVE SOURCES.]: Brand: PNEUMOSURE

## (undated) DEVICE — DRAPE EQUIPMENT RF WAND

## (undated) DEVICE — PREMIUM DRY TRAY LF: Brand: MEDLINE INDUSTRIES, INC.